# Patient Record
Sex: MALE | Race: WHITE | NOT HISPANIC OR LATINO
[De-identification: names, ages, dates, MRNs, and addresses within clinical notes are randomized per-mention and may not be internally consistent; named-entity substitution may affect disease eponyms.]

---

## 2024-07-02 PROBLEM — Z00.00 ENCOUNTER FOR PREVENTIVE HEALTH EXAMINATION: Status: ACTIVE | Noted: 2024-07-02

## 2024-07-03 DIAGNOSIS — Z87.898 PERSONAL HISTORY OF OTHER SPECIFIED CONDITIONS: ICD-10-CM

## 2024-07-03 DIAGNOSIS — Z87.891 PERSONAL HISTORY OF NICOTINE DEPENDENCE: ICD-10-CM

## 2024-07-03 DIAGNOSIS — I35.0 NONRHEUMATIC AORTIC (VALVE) STENOSIS: ICD-10-CM

## 2024-07-04 ENCOUNTER — NON-APPOINTMENT (OUTPATIENT)
Age: 79
End: 2024-07-04

## 2024-07-05 ENCOUNTER — APPOINTMENT (OUTPATIENT)
Dept: CARDIOTHORACIC SURGERY | Facility: CLINIC | Age: 79
End: 2024-07-05
Payer: COMMERCIAL

## 2024-07-05 ENCOUNTER — NON-APPOINTMENT (OUTPATIENT)
Age: 79
End: 2024-07-05

## 2024-07-05 VITALS
TEMPERATURE: 97.2 F | OXYGEN SATURATION: 97 % | DIASTOLIC BLOOD PRESSURE: 74 MMHG | SYSTOLIC BLOOD PRESSURE: 120 MMHG | WEIGHT: 190 LBS | BODY MASS INDEX: 25.73 KG/M2 | HEIGHT: 72 IN | HEART RATE: 72 BPM

## 2024-07-05 PROCEDURE — 99204 OFFICE O/P NEW MOD 45 MIN: CPT

## 2024-07-06 LAB — NT-PROBNP SERPL-MCNC: 781 PG/ML

## 2024-07-17 RX ORDER — PNV NO.95/FERROUS FUM/FOLIC AC 28MG-0.8MG
TABLET ORAL
Refills: 0 | Status: ACTIVE | COMMUNITY

## 2024-07-18 ENCOUNTER — APPOINTMENT (OUTPATIENT)
Dept: NEPHROLOGY | Facility: CLINIC | Age: 79
End: 2024-07-18
Payer: COMMERCIAL

## 2024-07-18 VITALS
OXYGEN SATURATION: 99 % | WEIGHT: 190 LBS | DIASTOLIC BLOOD PRESSURE: 78 MMHG | HEART RATE: 78 BPM | SYSTOLIC BLOOD PRESSURE: 124 MMHG | BODY MASS INDEX: 25.77 KG/M2

## 2024-07-18 DIAGNOSIS — Z80.8 FAMILY HISTORY OF MALIGNANT NEOPLASM OF OTHER ORGANS OR SYSTEMS: ICD-10-CM

## 2024-07-18 DIAGNOSIS — Z82.49 FAMILY HISTORY OF ISCHEMIC HEART DISEASE AND OTHER DISEASES OF THE CIRCULATORY SYSTEM: ICD-10-CM

## 2024-07-18 DIAGNOSIS — N18.32 CHRONIC KIDNEY DISEASE, STAGE 3B: ICD-10-CM

## 2024-07-18 DIAGNOSIS — Z78.9 OTHER SPECIFIED HEALTH STATUS: ICD-10-CM

## 2024-07-18 DIAGNOSIS — Z80.0 FAMILY HISTORY OF MALIGNANT NEOPLASM OF DIGESTIVE ORGANS: ICD-10-CM

## 2024-07-18 PROCEDURE — G2211 COMPLEX E/M VISIT ADD ON: CPT | Mod: NC

## 2024-07-18 PROCEDURE — 99204 OFFICE O/P NEW MOD 45 MIN: CPT

## 2024-07-22 ENCOUNTER — NON-APPOINTMENT (OUTPATIENT)
Age: 79
End: 2024-07-22

## 2024-07-22 DIAGNOSIS — R42 DIZZINESS AND GIDDINESS: ICD-10-CM

## 2024-07-22 LAB
ALBUMIN SERPL ELPH-MCNC: 4.3 G/DL
ANION GAP SERPL CALC-SCNC: 13 MMOL/L
BUN SERPL-MCNC: 25 MG/DL
CALCIUM SERPL-MCNC: 9.7 MG/DL
CALCIUM SERPL-MCNC: 9.7 MG/DL
CHLORIDE SERPL-SCNC: 103 MMOL/L
CO2 SERPL-SCNC: 24 MMOL/L
CREAT SERPL-MCNC: 1.47 MG/DL
CREAT SPEC-SCNC: 53 MG/DL
CREAT/PROT UR: 0.1 RATIO
EGFR: 48 ML/MIN/1.73M2
GLUCOSE SERPL-MCNC: 108 MG/DL
M PROTEIN SPEC IFE-MCNC: NORMAL
PARATHYROID HORMONE INTACT: 33 PG/ML
PHOSPHATE SERPL-MCNC: 3.3 MG/DL
POTASSIUM SERPL-SCNC: 4.7 MMOL/L
PROT UR-MCNC: 5 MG/DL
SODIUM SERPL-SCNC: 139 MMOL/L

## 2024-08-05 ENCOUNTER — RESULT REVIEW (OUTPATIENT)
Age: 79
End: 2024-08-05

## 2024-08-06 ENCOUNTER — RESULT REVIEW (OUTPATIENT)
Age: 79
End: 2024-08-06

## 2024-08-12 ENCOUNTER — APPOINTMENT (OUTPATIENT)
Dept: CARDIOTHORACIC SURGERY | Facility: CLINIC | Age: 79
End: 2024-08-12
Payer: COMMERCIAL

## 2024-08-12 DIAGNOSIS — I35.0 NONRHEUMATIC AORTIC (VALVE) STENOSIS: ICD-10-CM

## 2024-08-12 PROCEDURE — 99214 OFFICE O/P EST MOD 30 MIN: CPT

## 2024-08-13 NOTE — HISTORY OF PRESENT ILLNESS
[FreeTextEntry1] : Referred by Dr. Bautista Ni  79M, former smoker, with PMH of skin CA (basal cell of chest), vertigo, OA of b/l knees (L >R), stage 3 CKD (recent Cr 1.70), prior smoker (1PPD x30yrs, d/c'd 30yrs ago) who had a recent episode of dizziness went to a local urgent care with exam notable for a heart murmur and was found to have LFLG severe AS who presents for follow up after testing.    ECHO 6/4/24 at [facility] shows normal EF of > 55%, mild concentric hypertrophy, Paradoxical low flow low gradient severe aortic stenosis, moderate mitral annular calcification mild MR; mean gradient 39mmHg, LAURA 0.7cm2, SVi 32cc/m2, DVI 0.19.   7/18/24: BUN/Cr 25/1.45  EKG: nsr QRS 98mse, LAFB  TAVR CTs done on 8/6/2024 which showed: IMPRESSION: Cardiac: 1.  Please note, study is not optimized for coronary artery evaluation. 2.  Less than 50% stenosis of the Left Main. 3.  Calcific plaque in the proximal LAD where significant stenosis cannot be excluded. 4.  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 No obvious stenosis or plaque noted. 5.  Nonobstructive disease in portions of the remaining segments.  Non-cardiac: - No aortic dissection, aortic aneurysm or aorta stenosis. Diffuse atherosclerotic calcifications as above. Multiple ulcerated plaques in the aortic arch and proximal descending thoracic aorta. Aortic valve and coronary artery calcifications. - Mild subpleural reticulations in the lingula and both lower lobes suggesting mild chronic interstitial lung disease. - Colonic diverticulosis. - 5 mm nonspecific hypervascular liver lesion which may be benign or malignant etiologies. Recommend a follow-up MRI with hepatic mass protocol/with and without IV contrast. CT head: No acute intracranial hemorrhage, mass effect, or midline shift.  Since his last visit, the patient states he has been feeling well.  He has had no further dizziness (only lasted 2-3 days).  He denies chest pain, shortness of breath at rest, syncope, orthopnea, PND, or LE edema.  He states he has no shortness of breath with exertion, however, he is limited in his mobility with his arthritis in his knees.    shx: etoh use, walk dog daily, , lives with wife. Will be on vacation from Aug 12-25 fhx: noncontributory

## 2024-08-13 NOTE — ASSESSMENT
[FreeTextEntry1] : 79M, former smoker, with PMH of skin CA (basal cell of chest), vertigo, OA of b/l knees (L >R), stage 3 CKD (recent Cr 1.70), prior smoker (1PPD x30yrs, d/c'd 30yrs ago) who had a recent episode of dizziness went to a local urgent care with exam notable for a heart murmur and was found to have LFLG severe AS who presents for follow up after testing.  The patient is clinically stable; NYHA Class I.  The TAVR CTs were independently reviewed by Dr. Staley which shows a calcified aortic valve and the patient is a candidate for a transfemoral TAVR.  The TAVR procedure and its risks including stroke, bleeding, vascular complications, need for PPM, etc. were discussed with the patient.  The patient's BNP is elevated indicated stress on the heart from the valve disease and the results were discussed with the patient. Dr. Staley recommends the patient have a stress ECHO to further evaluate the patient's activity tolerance and symptoms to determine when intervention is appropriate.  The patient agrees and verbalized understanding of the plan.  All questions answered.

## 2024-08-13 NOTE — REASON FOR VISIT
[Structural Heart and Valve Disease] : structural heart and valve disease [Home] : at home, [unfilled] , at the time of the visit. [Medical Office: ([facility])___] : at the medical office located in  [Patient] : the patient [Self] : self

## 2024-08-13 NOTE — REASON FOR VISIT
[Structural Heart and Valve Disease] : structural heart and valve disease [Home] : at home, [unfilled] , at the time of the visit. [Medical Office: ([facility])___] : at the medical office located in  [Patient] : the patient [Self] : self

## 2024-08-13 NOTE — PLAN
[TextEntry] :  (1) Schedule Stress ECHO to evaluate symptoms and activity tolerance (2) Follow up with Dr. Staley and Dr. aWgner for surgical consult after testing (3) Continue cardiology care and medication management with Dr. Ni

## 2024-08-13 NOTE — PLAN
[TextEntry] :  (1) Schedule Stress ECHO to evaluate symptoms and activity tolerance (2) Follow up with Dr. Staley and Dr. Wagner for surgical consult after testing (3) Continue cardiology care and medication management with Dr. Ni

## 2024-08-13 NOTE — END OF VISIT
[FreeTextEntry3] : I, Chastity Jerry, am scribing for Dr. Benny Staley the following sections: HISTORY OF PRESENT ILLNESS, PAST MEDICAL/FAMILY/SOCIAL HISTORY, REVIEW OF SYSTEMS, VITAL SIGNS, PHYSICAL EXAM AND DISPOSITION.   I personally performed the services described in the documentation and reviewed the documented recorded by the scribe in my presence; it accurately and completely records my words and actions.

## 2024-09-17 ENCOUNTER — NON-APPOINTMENT (OUTPATIENT)
Age: 79
End: 2024-09-17

## 2024-09-23 ENCOUNTER — NON-APPOINTMENT (OUTPATIENT)
Age: 79
End: 2024-09-23

## 2024-09-23 ENCOUNTER — APPOINTMENT (OUTPATIENT)
Dept: CARDIOTHORACIC SURGERY | Facility: CLINIC | Age: 79
End: 2024-09-23
Payer: COMMERCIAL

## 2024-09-23 VITALS
BODY MASS INDEX: 25.73 KG/M2 | WEIGHT: 190 LBS | TEMPERATURE: 98.7 F | OXYGEN SATURATION: 98 % | SYSTOLIC BLOOD PRESSURE: 130 MMHG | HEIGHT: 72 IN | HEART RATE: 58 BPM | DIASTOLIC BLOOD PRESSURE: 70 MMHG

## 2024-09-23 PROCEDURE — 99203 OFFICE O/P NEW LOW 30 MIN: CPT

## 2024-09-24 ENCOUNTER — NON-APPOINTMENT (OUTPATIENT)
Age: 79
End: 2024-09-24

## 2024-09-24 LAB
ALBUMIN SERPL ELPH-MCNC: 4.4 G/DL
ALP BLD-CCNC: 51 U/L
ALT SERPL-CCNC: 14 U/L
ANION GAP SERPL CALC-SCNC: 14 MMOL/L
APTT BLD: 33.3 SEC
AST SERPL-CCNC: 21 U/L
BILIRUB SERPL-MCNC: 0.5 MG/DL
BUN SERPL-MCNC: 28 MG/DL
CALCIUM SERPL-MCNC: 9.8 MG/DL
CHLORIDE SERPL-SCNC: 103 MMOL/L
CO2 SERPL-SCNC: 23 MMOL/L
CREAT SERPL-MCNC: 1.45 MG/DL
EGFR: 49 ML/MIN/1.73M2
GLUCOSE SERPL-MCNC: 68 MG/DL
HCT VFR BLD CALC: 42.6 %
HGB BLD-MCNC: 14.3 G/DL
INR PPP: 0.95 RATIO
MCHC RBC-ENTMCNC: 33.6 GM/DL
MCHC RBC-ENTMCNC: 34.1 PG
MCV RBC AUTO: 101.7 FL
PLATELET # BLD AUTO: 144 K/UL
POTASSIUM SERPL-SCNC: 5.2 MMOL/L
PROT SERPL-MCNC: 7.7 G/DL
PT BLD: 10.8 SEC
RBC # BLD: 4.19 M/UL
RBC # FLD: 13.6 %
SODIUM SERPL-SCNC: 140 MMOL/L
WBC # FLD AUTO: 5.15 K/UL

## 2024-09-25 NOTE — HISTORY OF PRESENT ILLNESS
[FreeTextEntry1] : Referred by Dr. Bautista Ni  79M, former smoker, with PMH of skin CA (basal cell of chest), vertigo, OA of b/l knees (L >R), stage 3 CKD (recent Cr 1.70), prior smoker (1PPD x30yrs, d/c'd 30yrs ago) who had a recent episode of dizziness went to a local urgent care with exam notable for a heart murmur and was found to have LFLG severe AS for surgical consult.   ECHO 6/4/24 at [facility] shows normal EF of > 55%, mild concentric hypertrophy, Paradoxical low flow low gradient severe aortic stenosis, moderate mitral annular calcification mild MR; mean gradient 39mmHg, LAURA 0.7cm2, SVi 32cc/m2, DVI 0.19.   7/18/24: BUN/Cr 25/1.45  EKG: nsr QRS 98mse, LAFB  TAVR CTs done on 8/6/2024 which showed: IMPRESSION: Cardiac: 1.  Please note, study is not optimized for coronary artery evaluation. 2.  Less than 50% stenosis of the Left Main. 3.  Calcific plaque in the proximal LAD where significant stenosis cannot be excluded. 4.  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 No obvious stenosis or plaque noted. 5.  Nonobstructive disease in portions of the remaining segments.  Non-cardiac: - No aortic dissection, aortic aneurysm or aorta stenosis. Diffuse atherosclerotic calcifications as above. Multiple ulcerated plaques in the aortic arch and proximal descending thoracic aorta. Aortic valve and coronary artery calcifications. - Mild subpleural reticulations in the lingula and both lower lobes suggesting mild chronic interstitial lung disease. - Colonic diverticulosis. - 5 mm nonspecific hypervascular liver lesion which may be benign or malignant etiologies. Recommend a follow-up MRI with hepatic mass protocol/with and without IV contrast. CT head: No acute intracranial hemorrhage, mass effect, or midline shift.  The patient states he has been feeling well.  He has had no further dizziness (only lasted 2-3 days).  He denies chest pain, shortness of breath at rest, syncope, orthopnea, PND, or LE edema.  He states he has no shortness of breath with exertion, however, he is limited in his mobility with his arthritis in his knees.

## 2024-09-25 NOTE — ASSESSMENT
[FreeTextEntry1] : 79M, former smoker, with PMH of skin CA (basal cell of chest), vertigo, OA of b/l knees (L >R), stage 3 CKD (recent Cr 1.70), prior smoker (1PPD x30yrs, d/c'd 30yrs ago) who had a recent episode of dizziness went to a local urgent care with exam notable for a heart murmur and was found to have LFLG severe AS who presents for SAVR vs TAVR.  Discussed SAVR vs TAVR, based on discussions, pt may benefit from TAVR.  Pt is low risk for conventional SAVR, recovery may be limited by osteoarthritis of the knees.  DIscussed TAVR, procedural details, perioperative events, risks and benefits- risks include, but not limited to bleeding, infection, stroke, need for ppm, injury to kidneys, vascular or cardiac structures, need for emergency surgery.   Based on CT scan, will also need LHC at time of tavr.  Discussed the potential need for stenting and if so, would delay TAVR.  Pt asked questions that were answered to the best of my ability, pt agreed to proceed. labs today LHC/TAVR

## 2024-09-25 NOTE — PHYSICAL EXAM
[General Appearance - Alert] : alert [General Appearance - In No Acute Distress] : in no acute distress [General Appearance - Well Nourished] : well nourished [Sclera] : the sclera and conjunctiva were normal [Outer Ear] : the ears and nose were normal in appearance [Neck Appearance] : the appearance of the neck was normal [] : no respiratory distress [Respiration, Rhythm And Depth] : normal respiratory rhythm and effort [Exaggerated Use Of Accessory Muscles For Inspiration] : no accessory muscle use [Abnormal Walk] : normal gait [Oriented To Time, Place, And Person] : oriented to person, place, and time [Impaired Insight] : insight and judgment were intact [Affect] : the affect was normal

## 2024-10-08 ENCOUNTER — NON-APPOINTMENT (OUTPATIENT)
Age: 79
End: 2024-10-08

## 2024-10-08 VITALS
RESPIRATION RATE: 16 BRPM | DIASTOLIC BLOOD PRESSURE: 77 MMHG | TEMPERATURE: 97 F | HEIGHT: 72 IN | HEART RATE: 82 BPM | OXYGEN SATURATION: 98 % | SYSTOLIC BLOOD PRESSURE: 156 MMHG | WEIGHT: 190.04 LBS

## 2024-10-08 NOTE — PRE-OP CHECKLIST - LATEX ALLERGY
Repatha   Received: Today   Message Contents   Flor Delvalle, Med Ass't  Roxane Brown R.N.   Phone Number: [phone]             [place] pt wife Patti wants info/help on repatha, doesn't know how to go about getting it.     Please call her at [phone]        Previously, pt took repatha which was managed by the [facility]. [facility] clinic was able to get medication for zero co-pay for them. Pt stopped the medication because he thought it was giving him myalgias. They later found out this was not true. Pt needs new PA for new year.  Explained to wife that this can be discussed further and final decisions can be made at upcoming appt 2/19.     Per wife, pt is doing better. Sounds like he is only taking metoprolol 12.5mg BID as he was not tolerating full dose.   
no

## 2024-10-08 NOTE — PATIENT PROFILE ADULT - FUNCTIONAL ASSESSMENT - DAILY ACTIVITY SCORE.
Patient referred to skilled nursing for short term rehab. Await bed offer. Patient has Care Improvement Plus and she does not require a three night stay. Rita Ramos 24

## 2024-10-08 NOTE — PATIENT PROFILE ADULT - FALL HARM RISK - HARM RISK INTERVENTIONS
Assistance with ambulation/Assistance OOB with selected safe patient handling equipment/Communicate Risk of Fall with Harm to all staff/Discuss with provider need for PT consult/Monitor for mental status changes/Monitor gait and stability/Move patient closer to nurses' station/Orthostatic vital signs/Provide patient with walking aids - walker, cane, crutches/Reinforce activity limits and safety measures with patient and family/Reorient to person, place and time as needed/Review medications for side effects contributing to fall risk/Sit up slowly, dangle for a short time, stand at bedside before walking/Tailored Fall Risk Interventions/Toileting schedule using arm’s reach rule for commode and bathroom/Use of alarms - bed, chair and/or voice tab/Visual Cue: Yellow wristband and red socks/Bed in lowest position, wheels locked, appropriate side rails in place/Call bell, personal items and telephone in reach/Instruct patient to call for assistance before getting out of bed or chair/Non-slip footwear when patient is out of bed/[place] to call system/Physically safe environment - no spills, clutter or unnecessary equipment/Purposeful Proactive Rounding/Room/bathroom lighting operational, light cord in reach

## 2024-10-09 ENCOUNTER — TRANSCRIPTION ENCOUNTER (OUTPATIENT)
Age: 79
End: 2024-10-09

## 2024-10-09 ENCOUNTER — INPATIENT (INPATIENT)
Facility: HOSPITAL | Age: 79
LOS: 0 days | Discharge: HOME HEALTH | End: 2024-10-10
Attending: INTERNAL MEDICINE | Admitting: INTERNAL MEDICINE
Payer: COMMERCIAL

## 2024-10-09 ENCOUNTER — APPOINTMENT (OUTPATIENT)
Dept: CARDIOTHORACIC SURGERY | Facility: HOSPITAL | Age: 79
End: 2024-10-09

## 2024-10-09 ENCOUNTER — RESULT REVIEW (OUTPATIENT)
Age: 79
End: 2024-10-09

## 2024-10-09 LAB
ALBUMIN SERPL ELPH-MCNC: 3.4 G/DL — SIGNIFICANT CHANGE UP (ref 3.3–5)
ALBUMIN SERPL ELPH-MCNC: 4.4 G/DL — SIGNIFICANT CHANGE UP (ref 3.3–5)
ALP SERPL-CCNC: 42 U/L — SIGNIFICANT CHANGE UP (ref 40–120)
ALP SERPL-CCNC: 51 U/L — SIGNIFICANT CHANGE UP (ref 40–120)
ALT FLD-CCNC: 12 U/L — SIGNIFICANT CHANGE UP (ref 10–45)
ALT FLD-CCNC: 9 U/L — LOW (ref 10–45)
ANION GAP SERPL CALC-SCNC: 10 MMOL/L — SIGNIFICANT CHANGE UP (ref 5–17)
ANION GAP SERPL CALC-SCNC: 8 MMOL/L — SIGNIFICANT CHANGE UP (ref 5–17)
APTT BLD: 31.4 SEC — SIGNIFICANT CHANGE UP (ref 24.5–35.6)
APTT BLD: 34.5 SEC — SIGNIFICANT CHANGE UP (ref 24.5–35.6)
APTT BLD: 48.9 SEC — HIGH (ref 24.5–35.6)
AST SERPL-CCNC: 17 U/L — SIGNIFICANT CHANGE UP (ref 10–40)
AST SERPL-CCNC: 21 U/L — SIGNIFICANT CHANGE UP (ref 10–40)
BASE EXCESS BLDA CALC-SCNC: -2.3 MMOL/L — LOW (ref -2–3)
BASE EXCESS BLDA CALC-SCNC: -3.8 MMOL/L — LOW (ref -2–3)
BASE EXCESS BLDA CALC-SCNC: -4.7 MMOL/L — LOW (ref -2–3)
BASOPHILS # BLD AUTO: 0.02 K/UL — SIGNIFICANT CHANGE UP (ref 0–0.2)
BASOPHILS NFR BLD AUTO: 0.3 % — SIGNIFICANT CHANGE UP (ref 0–2)
BILIRUB SERPL-MCNC: 0.4 MG/DL — SIGNIFICANT CHANGE UP (ref 0.2–1.2)
BILIRUB SERPL-MCNC: 0.6 MG/DL — SIGNIFICANT CHANGE UP (ref 0.2–1.2)
BLD GP AB SCN SERPL QL: NEGATIVE — SIGNIFICANT CHANGE UP
BUN SERPL-MCNC: 21 MG/DL — SIGNIFICANT CHANGE UP (ref 7–23)
BUN SERPL-MCNC: 22 MG/DL — SIGNIFICANT CHANGE UP (ref 7–23)
CA-I BLDA-SCNC: 1.1 MMOL/L — LOW (ref 1.15–1.33)
CA-I BLDA-SCNC: 1.1 MMOL/L — LOW (ref 1.15–1.33)
CA-I BLDA-SCNC: 1.13 MMOL/L — LOW (ref 1.15–1.33)
CALCIUM SERPL-MCNC: 8.3 MG/DL — LOW (ref 8.4–10.5)
CALCIUM SERPL-MCNC: 9.3 MG/DL — SIGNIFICANT CHANGE UP (ref 8.4–10.5)
CHLORIDE SERPL-SCNC: 104 MMOL/L — SIGNIFICANT CHANGE UP (ref 96–108)
CHLORIDE SERPL-SCNC: 107 MMOL/L — SIGNIFICANT CHANGE UP (ref 96–108)
CO2 BLDA-SCNC: 22 MMOL/L — SIGNIFICANT CHANGE UP (ref 19–24)
CO2 SERPL-SCNC: 22 MMOL/L — SIGNIFICANT CHANGE UP (ref 22–31)
CO2 SERPL-SCNC: 26 MMOL/L — SIGNIFICANT CHANGE UP (ref 22–31)
COHGB MFR BLDA: 0.4 % — SIGNIFICANT CHANGE UP
COHGB MFR BLDA: 0.5 % — SIGNIFICANT CHANGE UP
COHGB MFR BLDA: 0.5 % — SIGNIFICANT CHANGE UP
CREAT SERPL-MCNC: 1.32 MG/DL — HIGH (ref 0.5–1.3)
CREAT SERPL-MCNC: 1.47 MG/DL — HIGH (ref 0.5–1.3)
EGFR: 48 ML/MIN/1.73M2 — LOW
EGFR: 55 ML/MIN/1.73M2 — LOW
EOSINOPHIL # BLD AUTO: 0.05 K/UL — SIGNIFICANT CHANGE UP (ref 0–0.5)
EOSINOPHIL NFR BLD AUTO: 0.8 % — SIGNIFICANT CHANGE UP (ref 0–6)
GAS PNL BLDA: SIGNIFICANT CHANGE UP
GLUCOSE BLDA-MCNC: 103 MG/DL — HIGH (ref 70–99)
GLUCOSE BLDA-MCNC: 105 MG/DL — HIGH (ref 70–99)
GLUCOSE BLDA-MCNC: 98 MG/DL — SIGNIFICANT CHANGE UP (ref 70–99)
GLUCOSE SERPL-MCNC: 110 MG/DL — HIGH (ref 70–99)
GLUCOSE SERPL-MCNC: 111 MG/DL — HIGH (ref 70–99)
HCO3 BLDA-SCNC: 21 MMOL/L — SIGNIFICANT CHANGE UP (ref 21–28)
HCT VFR BLD CALC: 33.5 % — LOW (ref 39–50)
HCT VFR BLD CALC: 41.1 % — SIGNIFICANT CHANGE UP (ref 39–50)
HGB BLD-MCNC: 11.6 G/DL — LOW (ref 13–17)
HGB BLD-MCNC: 14 G/DL — SIGNIFICANT CHANGE UP (ref 13–17)
HGB BLDA-MCNC: 11.6 G/DL — LOW (ref 12.6–17.4)
HGB BLDA-MCNC: 11.6 G/DL — LOW (ref 12.6–17.4)
HGB BLDA-MCNC: 12.6 G/DL — SIGNIFICANT CHANGE UP (ref 12.6–17.4)
IMM GRANULOCYTES NFR BLD AUTO: 1.1 % — HIGH (ref 0–0.9)
INR BLD: 1 — SIGNIFICANT CHANGE UP (ref 0.85–1.16)
INR BLD: 1.03 — SIGNIFICANT CHANGE UP (ref 0.85–1.16)
INR BLD: 1.12 — SIGNIFICANT CHANGE UP (ref 0.85–1.16)
LYMPHOCYTES # BLD AUTO: 1.06 K/UL — SIGNIFICANT CHANGE UP (ref 1–3.3)
LYMPHOCYTES # BLD AUTO: 16.3 % — SIGNIFICANT CHANGE UP (ref 13–44)
MAGNESIUM SERPL-MCNC: 1.9 MG/DL — SIGNIFICANT CHANGE UP (ref 1.6–2.6)
MAGNESIUM SERPL-MCNC: 2 MG/DL — SIGNIFICANT CHANGE UP (ref 1.6–2.6)
MCHC RBC-ENTMCNC: 34.1 GM/DL — SIGNIFICANT CHANGE UP (ref 32–36)
MCHC RBC-ENTMCNC: 34.4 PG — HIGH (ref 27–34)
MCHC RBC-ENTMCNC: 34.5 PG — HIGH (ref 27–34)
MCHC RBC-ENTMCNC: 34.6 GM/DL — SIGNIFICANT CHANGE UP (ref 32–36)
MCV RBC AUTO: 101 FL — HIGH (ref 80–100)
MCV RBC AUTO: 99.7 FL — SIGNIFICANT CHANGE UP (ref 80–100)
METHGB MFR BLDA: 0.5 % — SIGNIFICANT CHANGE UP
METHGB MFR BLDA: 0.6 % — SIGNIFICANT CHANGE UP
METHGB MFR BLDA: 0.8 % — SIGNIFICANT CHANGE UP
MONOCYTES # BLD AUTO: 0.45 K/UL — SIGNIFICANT CHANGE UP (ref 0–0.9)
MONOCYTES NFR BLD AUTO: 6.9 % — SIGNIFICANT CHANGE UP (ref 2–14)
NEUTROPHILS # BLD AUTO: 4.84 K/UL — SIGNIFICANT CHANGE UP (ref 1.8–7.4)
NEUTROPHILS NFR BLD AUTO: 74.6 % — SIGNIFICANT CHANGE UP (ref 43–77)
NRBC # BLD: 0 /100 WBCS — SIGNIFICANT CHANGE UP (ref 0–0)
NRBC # BLD: 0 /100 WBCS — SIGNIFICANT CHANGE UP (ref 0–0)
OXYHGB MFR BLDA: 97.2 % — HIGH (ref 90–95)
OXYHGB MFR BLDA: 97.4 % — HIGH (ref 90–95)
OXYHGB MFR BLDA: 97.6 % — HIGH (ref 90–95)
PCO2 BLDA: 32 MMHG — LOW (ref 35–48)
PCO2 BLDA: 35 MMHG — SIGNIFICANT CHANGE UP (ref 35–48)
PCO2 BLDA: 41 MMHG — SIGNIFICANT CHANGE UP (ref 35–48)
PH BLDA: 7.32 — LOW (ref 7.35–7.45)
PH BLDA: 7.38 — SIGNIFICANT CHANGE UP (ref 7.35–7.45)
PH BLDA: 7.43 — SIGNIFICANT CHANGE UP (ref 7.35–7.45)
PLATELET # BLD AUTO: 109 K/UL — LOW (ref 150–400)
PLATELET # BLD AUTO: 135 K/UL — LOW (ref 150–400)
PO2 BLDA: 155 MMHG — HIGH (ref 83–108)
PO2 BLDA: 181 MMHG — HIGH (ref 83–108)
PO2 BLDA: 206 MMHG — HIGH (ref 83–108)
POTASSIUM BLDA-SCNC: 3.6 MMOL/L — SIGNIFICANT CHANGE UP (ref 3.5–5.1)
POTASSIUM BLDA-SCNC: 3.6 MMOL/L — SIGNIFICANT CHANGE UP (ref 3.5–5.1)
POTASSIUM BLDA-SCNC: 3.7 MMOL/L — SIGNIFICANT CHANGE UP (ref 3.5–5.1)
POTASSIUM SERPL-MCNC: 4 MMOL/L — SIGNIFICANT CHANGE UP (ref 3.5–5.3)
POTASSIUM SERPL-MCNC: 4.1 MMOL/L — SIGNIFICANT CHANGE UP (ref 3.5–5.3)
POTASSIUM SERPL-SCNC: 4 MMOL/L — SIGNIFICANT CHANGE UP (ref 3.5–5.3)
POTASSIUM SERPL-SCNC: 4.1 MMOL/L — SIGNIFICANT CHANGE UP (ref 3.5–5.3)
PROT SERPL-MCNC: 6.1 G/DL — SIGNIFICANT CHANGE UP (ref 6–8.3)
PROT SERPL-MCNC: 8 G/DL — SIGNIFICANT CHANGE UP (ref 6–8.3)
PROTHROM AB SERPL-ACNC: 11.5 SEC — SIGNIFICANT CHANGE UP (ref 9.9–13.4)
PROTHROM AB SERPL-ACNC: 12.1 SEC — SIGNIFICANT CHANGE UP (ref 9.9–13.4)
PROTHROM AB SERPL-ACNC: 13.1 SEC — SIGNIFICANT CHANGE UP (ref 9.9–13.4)
RBC # BLD: 3.36 M/UL — LOW (ref 4.2–5.8)
RBC # BLD: 4.07 M/UL — LOW (ref 4.2–5.8)
RBC # FLD: 13.2 % — SIGNIFICANT CHANGE UP (ref 10.3–14.5)
RBC # FLD: 13.2 % — SIGNIFICANT CHANGE UP (ref 10.3–14.5)
RH IG SCN BLD-IMP: POSITIVE — SIGNIFICANT CHANGE UP
SAO2 % BLDA: 98.5 % — HIGH (ref 94–98)
SAO2 % BLDA: 98.5 % — HIGH (ref 94–98)
SAO2 % BLDA: 98.6 % — HIGH (ref 94–98)
SODIUM BLDA-SCNC: 136 MMOL/L — SIGNIFICANT CHANGE UP (ref 136–145)
SODIUM BLDA-SCNC: 137 MMOL/L — SIGNIFICANT CHANGE UP (ref 136–145)
SODIUM BLDA-SCNC: 137 MMOL/L — SIGNIFICANT CHANGE UP (ref 136–145)
SODIUM SERPL-SCNC: 137 MMOL/L — SIGNIFICANT CHANGE UP (ref 135–145)
SODIUM SERPL-SCNC: 140 MMOL/L — SIGNIFICANT CHANGE UP (ref 135–145)
WBC # BLD: 6.18 K/UL — SIGNIFICANT CHANGE UP (ref 3.8–10.5)
WBC # BLD: 6.49 K/UL — SIGNIFICANT CHANGE UP (ref 3.8–10.5)
WBC # FLD AUTO: 6.18 K/UL — SIGNIFICANT CHANGE UP (ref 3.8–10.5)
WBC # FLD AUTO: 6.49 K/UL — SIGNIFICANT CHANGE UP (ref 3.8–10.5)

## 2024-10-09 PROCEDURE — 33361 REPLACE AORTIC VALVE PERQ: CPT | Mod: 62,Q0

## 2024-10-09 PROCEDURE — 93452 LEFT HRT CATH W/VENTRCLGRPHY: CPT | Mod: 26,59

## 2024-10-09 PROCEDURE — 93452 LEFT HRT CATH W/VENTRCLGRPHY: CPT | Mod: 26,80,59

## 2024-10-09 PROCEDURE — 93010 ELECTROCARDIOGRAM REPORT: CPT | Mod: 1L,76

## 2024-10-09 PROCEDURE — 93308 TTE F-UP OR LMTD: CPT | Mod: 26,59

## 2024-10-09 PROCEDURE — 71045 X-RAY EXAM CHEST 1 VIEW: CPT | Mod: 26

## 2024-10-09 DEVICE — EMERALD GUIDEWIRE 0.35: Type: IMPLANTABLE DEVICE | Status: FUNCTIONAL

## 2024-10-09 DEVICE — LOADING SYSTEM EVOLUT FX 23-29MM: Type: IMPLANTABLE DEVICE | Status: FUNCTIONAL

## 2024-10-09 DEVICE — VLV AORTIC EVOLUT FX PLUS 29MM: Type: IMPLANTABLE DEVICE | Status: FUNCTIONAL

## 2024-10-09 DEVICE — GUIDEWIRE LUNDERQUIST EXTRA-STIFF DOUBLE CURVED .035" X 260CM: Type: IMPLANTABLE DEVICE | Status: FUNCTIONAL

## 2024-10-09 DEVICE — WIREGUIDE TOROFLEX .018X40CM: Type: IMPLANTABLE DEVICE | Status: FUNCTIONAL

## 2024-10-09 DEVICE — PACING CATH PACEL RIGHT HEART CURVE 5FR: Type: IMPLANTABLE DEVICE | Status: FUNCTIONAL

## 2024-10-09 DEVICE — GWIRE GUID  0.035INX150CM: Type: IMPLANTABLE DEVICE | Status: FUNCTIONAL

## 2024-10-09 DEVICE — GWIRE JTIP 1.5MM .035X180CM: Type: IMPLANTABLE DEVICE | Status: FUNCTIONAL

## 2024-10-09 DEVICE — SHEATH INTRODUCER TERUMO PINNACLE CORONARY 8FR X 10CM X 0.038" MINI WIRE: Type: IMPLANTABLE DEVICE | Status: FUNCTIONAL

## 2024-10-09 DEVICE — INTRO MICROPUNC 4FRX10CM SS: Type: IMPLANTABLE DEVICE | Status: FUNCTIONAL

## 2024-10-09 DEVICE — CATH BLLN Z-MED 18X4: Type: IMPLANTABLE DEVICE | Status: FUNCTIONAL

## 2024-10-09 DEVICE — IMPLANTABLE DEVICE: Type: IMPLANTABLE DEVICE | Status: FUNCTIONAL

## 2024-10-09 DEVICE — CATH DX PIG 145 INFIN 5FRX110CM: Type: IMPLANTABLE DEVICE | Status: FUNCTIONAL

## 2024-10-09 DEVICE — INTRODUCER SHEATH KIT GLIDESHEATH SLENDER FLEX STRAIGHT 21G 6F X 10CM: Type: IMPLANTABLE DEVICE | Status: FUNCTIONAL

## 2024-10-09 DEVICE — CATH DX AL1 INFIN 5FRX100CM: Type: IMPLANTABLE DEVICE | Status: FUNCTIONAL

## 2024-10-09 DEVICE — SUT PERCLOSE PROGLIDE 6FR: Type: IMPLANTABLE DEVICE | Status: FUNCTIONAL

## 2024-10-09 DEVICE — CATH  INFINITI 5FR MULTIPACK: Type: IMPLANTABLE DEVICE | Status: FUNCTIONAL

## 2024-10-09 DEVICE — SHEATH INTRO DRYSEAL FLEX 14FR 33CM: Type: IMPLANTABLE DEVICE | Status: FUNCTIONAL

## 2024-10-09 DEVICE — CATH DX JL5 INFIN 5FRX100CM: Type: IMPLANTABLE DEVICE | Status: FUNCTIONAL

## 2024-10-09 RX ORDER — HYDRALAZINE HYDROCHLORIDE 100 MG/1
2.5 TABLET ORAL ONCE
Refills: 0 | Status: COMPLETED | OUTPATIENT
Start: 2024-10-09 | End: 2024-10-09

## 2024-10-09 RX ORDER — CHLORHEXIDINE GLUCONATE ORAL RINSE 1.2 MG/ML
1 SOLUTION DENTAL DAILY
Refills: 0 | Status: DISCONTINUED | OUTPATIENT
Start: 2024-10-09 | End: 2024-10-10

## 2024-10-09 RX ORDER — PANTOPRAZOLE SODIUM 40 MG/1
40 TABLET, DELAYED RELEASE ORAL
Refills: 0 | Status: DISCONTINUED | OUTPATIENT
Start: 2024-10-09 | End: 2024-10-10

## 2024-10-09 RX ORDER — ASPIRIN 325 MG
162 TABLET ORAL ONCE
Refills: 0 | Status: COMPLETED | OUTPATIENT
Start: 2024-10-09 | End: 2024-10-09

## 2024-10-09 RX ORDER — ACETAMINOPHEN 325 MG
650 TABLET ORAL EVERY 6 HOURS
Refills: 0 | Status: DISCONTINUED | OUTPATIENT
Start: 2024-10-09 | End: 2024-10-10

## 2024-10-09 RX ORDER — CEFAZOLIN SODIUM 1 G
2000 VIAL (EA) INJECTION EVERY 8 HOURS
Refills: 0 | Status: COMPLETED | OUTPATIENT
Start: 2024-10-09 | End: 2024-10-10

## 2024-10-09 RX ORDER — NICARDIPINE HCL 25 MG/10ML
5 AMPUL (ML) INTRAVENOUS
Qty: 40 | Refills: 0 | Status: DISCONTINUED | OUTPATIENT
Start: 2024-10-09 | End: 2024-10-10

## 2024-10-09 RX ORDER — ASPIRIN 325 MG
81 TABLET ORAL DAILY
Refills: 0 | Status: DISCONTINUED | OUTPATIENT
Start: 2024-10-09 | End: 2024-10-10

## 2024-10-09 RX ADMIN — Medication 100 MILLIGRAM(S): at 21:38

## 2024-10-09 RX ADMIN — Medication 162 MILLIGRAM(S): at 13:07

## 2024-10-09 RX ADMIN — Medication 25 MG/HR: at 19:45

## 2024-10-09 NOTE — BRIEF OPERATIVE NOTE - NSICDXBRIEFPROCEDURE_GEN_ALL_CORE_FT
PROCEDURES:  TAVR, percutaneous 09-Oct-2024 14:56:29 LHC/TF TAVR by [facility] with 29mm Corevalve and 18mm Zmed pre-dilation Stephy Pina

## 2024-10-09 NOTE — BRIEF OPERATIVE NOTE - OPERATION/FINDINGS
LHC/TF TAVR by [facility] with 29mm Corevalve and 18mm Zmed pre-dilation  Access: RRA 6F, LCFV 6F TVP, RCFA 18F Main  Closure: RRA TR Band, LCFV, RCFA 2 Perclose, 1 angioseal  LHC/TF TAVR by [facility] with 29mm Corevalve and 18mm Zmed pre-dilation  Access: RRA 6F, LCFV 6F TVP, RCFA 18F Main  Closure: RRA TR Band, LCFV 1 mattress stitch, RCFA 2 Perclose, 1 angioseal  LHC/TF TAVR by [facility] with 29mm Corevalve and 18mm Zmed pre-dilation  Access: RRA 6F, LCFV 6F TVP, RCFA 18F Main  Closure: RRA TR Band, LCFV TVP in place, RCFA 2 Perclose, 1 angioseal   EKG: preop NSR QRSD 104   intra-op QRS widened

## 2024-10-09 NOTE — PRE-ANESTHESIA EVALUATION ADULT - NSANTHPMHFT_GEN_ALL_CORE
History of Present Illness:   79M, former smoker, with PMH of skin CA (basal cell of chest), vertigo, OA of b/l knees (L >R), stage 3 CKD (recent Cr 1.70), prior smoker (1PPD x30yrs, d/c'd 30yrs ago) who had a recent episode of dizziness went to a   local urgent care with exam notable for a heart murmur and was found to have LFLG severe AS for surgical consult.    The patient states he has been feeling well. He has had no further dizziness (only lasted 2-3 days). He denies chest pain, shortness of breath at rest, syncope, orthopnea, PND, or LE edema. He states he has no shortness of breath with exertion, however, he is limited in his mobility with his arthritis in his knees.

## 2024-10-09 NOTE — PRE-ANESTHESIA EVALUATION ADULT - NSRADCARDRESULTSFT_GEN_ALL_CORE
10/9/24 ECG:  NSR @ 75 LAD     6/6/24 TTE:   mild MR, severe AS 64 PG, 39MG LAURA 0.7 cm2 mild conc LVH

## 2024-10-09 NOTE — H&P ADULT - HISTORY OF PRESENT ILLNESS
79M, former smoker, with PMH of skin CA (basal cell of chest), vertigo, OA of b/l knees (L >R), stage 3 CKD   (recent Cr 1.70), prior smoker (1PPD x30yrs, d/c'd 30yrs ago) who had a recent episode of dizziness went to a   local urgent care with exam notable for a heart murmur and was found to have LFLG severe AS for surgical   consult.    The patient states he has been feeling well. He has had no further dizziness (only lasted 2-3 days). He denies   chest pain, shortness of breath at rest, syncope, orthopnea, PND, or LE edema. He states he has no shortness of   breath with exertion, however, he is limited in his mobility with his arthritis in his knees.    Patient seen in same day holding area; Reports no changes to PMHx or medications since last seen by our team. Denies acute or current SOB, chest pain, palpitation, N/V/D, fever/chills, recent illness, or any other concerning symptoms.   79M, former smoker, with PMH of skin CA (basal cell of chest), vertigo, OA of b/l knees (L >R), stage 3 CKD (recent Cr 1.70), prior smoker (1PPD x30yrs, d/c'd 30yrs ago) who had a recent episode of dizziness went to a   local urgent care with exam notable for a heart murmur and was found to have LFLG severe AS for surgical consult.    The patient states he has been feeling well. He has had no further dizziness (only lasted 2-3 days). He denies chest pain, shortness of breath at rest, syncope, orthopnea, PND, or LE edema. He states he has no shortness of breath with exertion, however, he is limited in his mobility with his arthritis in his knees.    Patient seen in same day holding area; Reports no changes to PMHx or medications since last seen by our team. Denies acute or current SOB, chest pain, palpitation, N/V/D, fever/chills, recent illness, or any other concerning symptoms.

## 2024-10-09 NOTE — PRE-ANESTHESIA EVALUATION ADULT - NSANTHPEFT_GEN_ALL_CORE
A&Ox3  neuro:  neck:  lung:  cor:  extr: A&Ox3  neuro: nonfocal  neck: no JVD  lung: CTA  cor: RRR S1 S2 5/6 EMETERIO RUSB, 4/6 holosystolic murmur LLSB  extr: no c,c,e. Radial/Robb OK

## 2024-10-09 NOTE — H&P ADULT - ASSESSMENT
79M, former smoker, with PMH of skin CA (basal cell of chest), vertigo, OA of b/l knees (L >R), stage 3 CKD   (recent Cr 1.70), prior smoker (1PPD x30yrs, d/c'd 30yrs ago) who had a recent episode of dizziness went to a   local urgent care with exam notable for a heart murmur and was found to have LFLG severe AS who presents for   SAVR vs TAVR. Discussed SAVR vs TAVR, based on discussions, pt may benefit from TAVR. Pt is low risk for   conventional SAVR, recovery may be limited by osteoarthritis of the knees. DIscussed TAVR, procedural details,   perioperative events, risks and benefits- risks include, but not limited to bleeding, infection, stroke, need for ppm,   injury to kidneys, vascular or cardiac structures, need for emergency surgery. Based on CT scan, will also need   LHC at time of tavr. Discussed the potential need for stenting and if so, would delay TAVR. Pt asked questions that  were answered to the best of my ability, pt agreed to proceed.  labs today LHC/TAVR.    -Plan for TAVR today   -Given 162mg ASA this AM  -Admit to [facility] post op     Admit under   via same day surgery. Consent signed, placed on chart.  Risks/benefits reviewed, patient understands and agrees. T&S ordered and blood products placed on hold for OR.  To 9  post-op.     79M, former smoker, with PMH of skin CA (basal cell of chest), vertigo, OA of b/l knees (L >R), stage 3 CKD (recent Cr 1.70), prior smoker (1PPD x30yrs, d/c'd 30yrs ago) who had a recent episode of dizziness went to a   local urgent care with exam notable for a heart murmur and was found to have LFLG severe AS who presents for SAVR vs TAVR. Discussed SAVR vs TAVR, based on discussions, pt may benefit from TAVR. Pt is low risk for conventional SAVR, recovery may be limited by osteoarthritis of the knees. DIscussed TAVR, procedural details, perioperative events, risks and benefits- risks include, but not limited to bleeding, infection, stroke, need for ppm, injury to kidneys, vascular or cardiac structures, need for emergency surgery. Based on CT scan, will also need LHC at time of tavr. Discussed the potential need for stenting and if so, would delay TAVR. Pt asked questions thatwere answered to the best of my ability, pt agreed to proceed.    -Plan for TAVR today   -Given 162mg ASA this AM  -Admit to [facility] post op     Admit under   via same day surgery. Consent signed, placed on chart.  Risks/benefits reviewed, patient understands and agrees. T&S ordered and blood products placed on hold for OR.  To 9  post-op.

## 2024-10-09 NOTE — H&P ADULT - NSICDXPASTMEDICALHX_GEN_ALL_CORE_FT
PAST MEDICAL HISTORY:  CA skin, basal cell     Osteoarthritis sylvia knees    Osteoporosis     Stage 3 chronic kidney disease     Vertigo

## 2024-10-09 NOTE — H&P ADULT - NSHPLABSRESULTS_GEN_ALL_CORE
ECHO 6/4/24 at [facility] shows normal EF of > 55%, mild concentric hypertrophy, Paradoxical low flow low   gradient severe aortic stenosis, moderate mitral annular calcification mild MR; mean gradient 39mmHg, LAURA   0.7cm2, SVi 32cc/m2, DVI 0.19.     7/18/24: BUN/Cr 25/1.45    EKG: nsr QRS 98mse, LAFB    TAVR CTs done on 8/6/2024 which showed:  IMPRESSION:  Cardiac:  1. Please note, study is not optimized for coronary artery evaluation.  2. Less than 50% stenosis of the Left Main.  3. Calcific plaque in the proximal LAD where significant stenosis cannot be excluded.  4. 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 No obvious stenosis or plaque noted.  5. Nonobstructive disease in portions of the remaining segments.  Non-cardiac:  - No aortic dissection, aortic aneurysm or aorta stenosis. Diffuse atherosclerotic calcifications as above. Multiple   ulcerated plaques in the aortic arch and proximal descending thoracic aorta. Aortic valve and coronary artery   calcifications.  - Mild subpleural reticulations in the lingula and both lower lobes suggesting mild chronic interstitial lung disease.  - Colonic diverticulosis.  - 5 mm nonspecific hypervascular liver lesion which may be benign or malignant etiologies. Recommend a follow-  up MRI with hepatic mass protocol/with and without IV contrast.  CT head: No acute intracranial hemorrhage, mass effect

## 2024-10-09 NOTE — H&P ADULT - NSHPREVIEWOFSYSTEMS_GEN_ALL_CORE
Review of Systems  CONSTITUTIONAL:  Denies Fevers / chills, sweats, fatigue, weight loss, weight gain                                      NEURO:  Denies parathesias, seizures, syncope, confusion                                                                                EYES:  Denies Blurry vision, discharge, pain, loss of vision                                                                                    ENMT:  Denies Difficulty hearing, vertigo, dysphagia, epistaxis, recent dental work                                       CV:  Denies Chest pain, palpitations, BURCH, orthopnea                                                                                          RESPIRATORY:  Denies Wheezing, SOB, cough / sputum, hemoptysis                                                                GI:  Denies Nausea, vommiting, diarrhea, constipation, melena, difficulty swallowing                                               : Denies Hematuria, dysuria, urgency, incontinence                                                                                         MUSKULOSKELETAL:  Denies arthritis, joint swelling, muscle weakness                                                             SKIN/BREAST:  Denies rash, itching, henrry loss, masses                                                                                            PSYCH:  Denies depresion, anxiety, suicidal ideation                                                                                               HEME/LYMPH:  Denies bruises easily, enlarged lymph nodes, tender lymph nodes                                        ENDOCRINE:  Denies cold intolerance, heat intolerance, polydipsia

## 2024-10-09 NOTE — CHART NOTE - NSCHARTNOTEFT_GEN_A_CORE
Procedure: LHC/TF TAVR by [facility] with 29mm Corevalve and 18mm Zmed pre-dilation    Access: RRA 6F, LCFV 6F TVP, RCFA 18F Main  Closure: RRA TR Band, LCFV TVP in place, RCFA 2 Perclose, 1 angioseal       TVP: Yes: left groin   Pre-existing rhythm issues:  No QRS: 104  Intra-op rhythm issues: Yes - widening QRS  Post-op rhythm issues:  No QRS: 114  TR Band: Yes right radial - plan for off at 1800    VS:  Vital Signs Last 24 Hrs  T(C): 36.1 (09 Oct 2024 17:18), Max: 36.3 (09 Oct 2024 12:06)  T(F): 96.9 (09 Oct 2024 17:18), Max: 97.3 (09 Oct 2024 12:06)  HR: 58 (09 Oct 2024 17:30) (58 - 82)  BP: 156/77 (09 Oct 2024 12:11) (156/77 - 156/77)  RR: 8 (09 Oct 2024 17:30) (8 - 21)  SpO2: 94% (09 Oct 2024 17:30) (94% - 98%)    Parameters below as of 09 Oct 2024 17:30  Patient On (Oxygen Delivery Method): room air        Physical Exam  Appearance: No acute distress.  Neurologic: AAOx3, no AMS or focal deficits.  Responds appropriately to verbal and physical stimuli; exhibits purposeful movement in all extremities.  HEENT:   MMM, PERRLA, EOMI	b/l  Neck: Supple  Cardiovascular: RRR, S1 S2. No m/r/g.  Respiratory: No acute respiratory distress. CTA b/l, no w/r/r.   Gastrointestinal:  Soft, non-tender, non-distended, + BS.	  Skin: No rashes. No ecchymoses. No cyanosis.  Extremities: Exhibits normal range of motion, no clubbing, cyanosis or edema.  Vascular: Peripheral pulses palpable 2+ bilaterally.  Groin sites: bilateral groin sites and right radial site c/d/i.    Bed rest: x 4 hrs  Anti-platelet: aspirin  Dispo: possible home tomorrow  TTE/EKG ordered: yes - repeat ecg at 2000

## 2024-10-09 NOTE — H&P ADULT - CLICK TO LAUNCH ORM
.
Normal rate, regular rhythm.  Heart sounds S1, S2.  No rubs or gallops. 2+ pulses in bilateral dp and radial arteries. Cap refill less than 2 seconds.

## 2024-10-09 NOTE — BRIEF OPERATIVE NOTE - PRIMARY SURGEON
Medicare Wellness Visit  Plan for Preventive Care    A good way for you to stay healthy is to use preventive care.  Medicare covers many services that can help you stay healthy.* The goal of these services is to find any health problems as quickly as possible. Finding problems early can help make them easier to treat.  Your personal plan below lists the services you may need and when they are due.      Health Maintenance Summary     Hepatitis B Vaccine (For Physician/APC Discussion) (3 of 3 - 19+ 3-dose series)  Overdue since 6/7/2002    Shingles Vaccine (2 of 3)  Overdue since 4/14/2012    Breast Cancer Screening (Every 2 Years)  Overdue since 6/1/2017    Pneumococcal Vaccine 65+ (3 - PPSV23 if available, else PCV20)  Overdue since 10/9/2017    DTaP/Tdap/Td Vaccine (2 - Td or Tdap)  Overdue since 1/10/2022    Depression Screening (Yearly)  Overdue since 6/4/2022    COVID-19 Vaccine (4 - Booster for Moderna series)  Overdue since 9/28/2022    Medicare Advantage- Medicare Wellness Visit (Yearly - January to December)  Due since 1/1/2023    Colorectal Cancer Screen- (Cologuard - Every 3 Years)  Next due on 9/20/2024    Osteoporosis Screening   Ordered on 5/27/2022    Hepatitis C Screening   Completed    Influenza Vaccine   Completed    Meningococcal Vaccine   Aged Out    HPV Vaccine   Aged Out           Preventive Care for Women and Men    Heart Screenings (Cardiovascular):  · Blood tests are used to check your cholesterol, lipid and triglyceride levels. High levels can increase your risk for heart disease and stroke. High levels can be treated with medications, diet and exercise. Lowering your levels can help keep your heart and blood vessels healthy.  Your provider will order these tests if they are needed.    · An ultrasound is done to see if you have an abdominal aortic aneurysm (AAA).  This is an enlargement of one of the main blood vessels that delivers blood to the body.   In the [place], 9,000 deaths  are caused by AAA.  You may not even know you have this problem and as many as 1 in 3 people will have a serious problem if it is not treated.  Early diagnosis allows for more effective treatment and cure.  If you have a family history of AAA or are a male age 65-75 who has smoked, you are at higher risk of an AAA.  Your provider can order this test, if needed.    Colorectal Screening:  · There are many tests that are used to check for cancer of your colon and rectum. You and your provider should discuss what test is best for you and when to have it done.  Options include:  · Screening Colonoscopy: exam of the entire colon, seen through a flexible lighted tube.  · Flexible Sigmoidoscopy: exam of the last third (sigmoid portion) of the colon and rectum, seen through a flexible lighted tube.  · Cologuard DNA stool test: a sample of your stool is used to screen for cancer and unseen blood in your stool.  · Fecal Occult Blood Test: a sample of your stool is studied to find any unseen blood    Flu Shot:  · An immunization that helps to prevent influenza (the flu). You should get this every year. The best time to get the shot is in the fall.    Pneumococcal Shot:  • Vaccines help prevent pneumococcal disease, which is any type of illness caused by Streptococcus pneumoniae bacteria. There are two kinds of pneumococcal vaccines available in the [place]:   o Pneumococcal conjugate vaccines (PCV20 or Asmqsqg81®)  o Pneumococcal polysaccharide vaccine (PPSV23 or Uygysiamm79®)  · For those who have never received any pneumococcal conjugate vaccine, CDC recommends PVC20 for adults 65 years or older and adults 19 through 64 years old with certain medical conditions or risk factors.   · For those who have previously received PCV13, this should be followed by a dose of PPSV23.     Hepatitis B Shot:  · An immunization that helps to protect people from getting Hepatitis B. Hepatitis B is a virus that spreads through contact  with infected blood or body fluids. Many people with the virus do not have symptoms.  The virus can lead to serious problems, such as liver disease. Some people are at higher risk than others. Your doctor will tell you if you need this shot.     Diabetes Screening:  · A test to measure sugar (glucose) in your blood is called a fasting blood sugar. Fasting means you cannot have food or drink for at least 8 hours before the test. This test can detect diabetes long before you may notice symptoms.    Glaucoma Screening:  · Glaucoma screening is performed by your eye doctor. The test measures the fluid pressure inside your eyes to determine if you have glaucoma.     Hepatitis C Screening:  · A blood test to see if you have the hepatitis C virus.  Hepatitis C attacks the liver and is a major cause of chronic liver disease.  Medicare will cover a single screening for all adults born between 1945 & 1965, or high risk patients (people who have injected illegal drugs or people who have had blood transfusions).  High risk patients who continue to inject illegal drugs can be screened for Hepatitis C every year.    Smoking and Tobacco-Use Cessation Counseling:  · Tobacco is the single greatest cause of disease and early death in our country today. Medication and counseling together can increase a person’s chance of quitting for good.   · Medicare covers two quitting attempts per year, with four counseling sessions per attempt (eight sessions in a 12 month period)    Preventive Screening tests for Women    Screening Mammograms and Breast Exams:  · An x-ray of your breasts to check for breast cancer before you or your doctor may be able to feel it.  If breast cancer is found early it can usually be treated with success.    Pelvic Exams and Pap Tests:  · An exam to check for cervical and vaginal cancer. A Pap test is a lab test in which cells are taken from your cervix and sent to the lab to look for signs of cervical cancer. If  cancer of the cervix is found early, chances for a cure are good. Testing can generally end at age 65, or if a woman has a hysterectomy for a benign condition. Your provider may recommend more frequent testing if certain abnormal results are found.    Bone Mass Measurements:  · A painless x-ray of your bone density to see if you are at risk for a broken bone. Bone density refers to the thickness of bones or how tightly the bone tissue is packed.    Preventive Screening tests for Men    Prostate Screening:  · Should you have a prostate cancer test (PSA)?  It is up to you to decide if you want a prostate cancer test. Talk to your clinician to find out if the test is right for you.  Things for you to consider and talk about should include:  · Benefits and harms of the test  · Your family history  · How your race/ethnicity may influence the test  · If the test may impact other medical conditions you have  · Your values on screenings and treatments    *Medicare pays for many preventive services to keep you healthy. For some of these services, you might have to pay a deductible, coinsurance, and / or copayment.  The amounts vary depending on the type of services you need and the kind of Medicare health plan you have.    For further details on screenings offered by Medicare please visit: https://www.medicare.gov/coverage/preventive-screening-services    Benny Staley (Attending) <<----- Click to Select Surgeon

## 2024-10-09 NOTE — H&P ADULT - NSHPPHYSICALEXAM_GEN_ALL_CORE
Appearance: No acute distress.  Neurologic: AAOx3, no AMS or focal deficits.  Responds appropriately to verbal and physical stimuli; exhibits purposeful movement in all extremities.  Cardiovascular: +murmur, RRR, S1 S2. No r/g.  Respiratory: No acute respiratory distress. CTA b/l, no w/r/r.   Gastrointestinal:  Soft, non-tender, non-distended, + BS.	  Skin: No rashes. No ecchymoses. No cyanosis.  Extremities: Exhibits normal range of motion, no clubbing, cyanosis or edema.  Vascular: Peripheral pulses palpable 2+ bilaterally.

## 2024-10-10 ENCOUNTER — INPATIENT (INPATIENT)
Facility: HOSPITAL | Age: 79
LOS: 0 days | Discharge: HOME | DRG: 312 | End: 2024-10-11
Attending: INTERNAL MEDICINE | Admitting: INTERNAL MEDICINE
Payer: COMMERCIAL

## 2024-10-10 ENCOUNTER — RESULT REVIEW (OUTPATIENT)
Age: 79
End: 2024-10-10

## 2024-10-10 ENCOUNTER — TRANSCRIPTION ENCOUNTER (OUTPATIENT)
Age: 79
End: 2024-10-10

## 2024-10-10 ENCOUNTER — NON-APPOINTMENT (OUTPATIENT)
Age: 79
End: 2024-10-10

## 2024-10-10 VITALS
WEIGHT: 190.04 LBS | TEMPERATURE: 98 F | OXYGEN SATURATION: 98 % | HEART RATE: 67 BPM | DIASTOLIC BLOOD PRESSURE: 60 MMHG | RESPIRATION RATE: 16 BRPM | SYSTOLIC BLOOD PRESSURE: 96 MMHG | HEIGHT: 72 IN

## 2024-10-10 VITALS
HEART RATE: 69 BPM | DIASTOLIC BLOOD PRESSURE: 72 MMHG | SYSTOLIC BLOOD PRESSURE: 170 MMHG | OXYGEN SATURATION: 94 % | RESPIRATION RATE: 18 BRPM

## 2024-10-10 PROBLEM — Z98.49 CATARACT EXTRACTION STATUS, UNSPECIFIED EYE: Chronic | Status: ACTIVE | Noted: 2024-10-09

## 2024-10-10 PROBLEM — M81.0 AGE-RELATED OSTEOPOROSIS WITHOUT CURRENT PATHOLOGICAL FRACTURE: Chronic | Status: ACTIVE | Noted: 2024-10-08

## 2024-10-10 PROBLEM — M19.90 UNSPECIFIED OSTEOARTHRITIS, UNSPECIFIED SITE: Chronic | Status: ACTIVE | Noted: 2024-10-09

## 2024-10-10 PROBLEM — C44.91 BASAL CELL CARCINOMA OF SKIN, UNSPECIFIED: Chronic | Status: ACTIVE | Noted: 2024-10-08

## 2024-10-10 PROBLEM — N18.30 CHRONIC KIDNEY DISEASE, STAGE 3 UNSPECIFIED: Chronic | Status: ACTIVE | Noted: 2024-10-09

## 2024-10-10 PROBLEM — R42 DIZZINESS AND GIDDINESS: Chronic | Status: ACTIVE | Noted: 2024-10-08

## 2024-10-10 LAB
ALBUMIN SERPL ELPH-MCNC: 3.7 G/DL — SIGNIFICANT CHANGE UP (ref 3.3–5)
ALP SERPL-CCNC: 47 U/L — SIGNIFICANT CHANGE UP (ref 40–120)
ALT FLD-CCNC: 10 U/L — SIGNIFICANT CHANGE UP (ref 10–45)
ANION GAP SERPL CALC-SCNC: 13 MMOL/L — SIGNIFICANT CHANGE UP (ref 5–17)
ANION GAP SERPL CALC-SCNC: 8 MMOL/L — SIGNIFICANT CHANGE UP (ref 5–17)
APTT BLD: 31.8 SEC — SIGNIFICANT CHANGE UP (ref 24.5–35.6)
AST SERPL-CCNC: 25 U/L — SIGNIFICANT CHANGE UP (ref 10–40)
BASOPHILS # BLD AUTO: 0.01 K/UL — SIGNIFICANT CHANGE UP (ref 0–0.2)
BASOPHILS # BLD AUTO: 0.02 K/UL — SIGNIFICANT CHANGE UP (ref 0–0.2)
BASOPHILS NFR BLD AUTO: 0.1 % — SIGNIFICANT CHANGE UP (ref 0–2)
BASOPHILS NFR BLD AUTO: 0.3 % — SIGNIFICANT CHANGE UP (ref 0–2)
BILIRUB SERPL-MCNC: 0.6 MG/DL — SIGNIFICANT CHANGE UP (ref 0.2–1.2)
BUN SERPL-MCNC: 18 MG/DL — SIGNIFICANT CHANGE UP (ref 7–23)
BUN SERPL-MCNC: 19 MG/DL — SIGNIFICANT CHANGE UP (ref 7–23)
CALCIUM SERPL-MCNC: 8.7 MG/DL — SIGNIFICANT CHANGE UP (ref 8.4–10.5)
CALCIUM SERPL-MCNC: 9.2 MG/DL — SIGNIFICANT CHANGE UP (ref 8.4–10.5)
CHLORIDE SERPL-SCNC: 102 MMOL/L — SIGNIFICANT CHANGE UP (ref 96–108)
CHLORIDE SERPL-SCNC: 106 MMOL/L — SIGNIFICANT CHANGE UP (ref 96–108)
CO2 SERPL-SCNC: 21 MMOL/L — LOW (ref 22–31)
CO2 SERPL-SCNC: 23 MMOL/L — SIGNIFICANT CHANGE UP (ref 22–31)
CREAT SERPL-MCNC: 1.25 MG/DL — SIGNIFICANT CHANGE UP (ref 0.5–1.3)
CREAT SERPL-MCNC: 1.45 MG/DL — HIGH (ref 0.5–1.3)
EGFR: 49 ML/MIN/1.73M2 — LOW
EGFR: 59 ML/MIN/1.73M2 — LOW
EOSINOPHIL # BLD AUTO: 0.03 K/UL — SIGNIFICANT CHANGE UP (ref 0–0.5)
EOSINOPHIL # BLD AUTO: 0.04 K/UL — SIGNIFICANT CHANGE UP (ref 0–0.5)
EOSINOPHIL NFR BLD AUTO: 0.3 % — SIGNIFICANT CHANGE UP (ref 0–6)
EOSINOPHIL NFR BLD AUTO: 0.6 % — SIGNIFICANT CHANGE UP (ref 0–6)
GAS PNL BLDA: SIGNIFICANT CHANGE UP
GLUCOSE SERPL-MCNC: 109 MG/DL — HIGH (ref 70–99)
GLUCOSE SERPL-MCNC: 145 MG/DL — HIGH (ref 70–99)
HCT VFR BLD CALC: 37.2 % — LOW (ref 39–50)
HCT VFR BLD CALC: 41.6 % — SIGNIFICANT CHANGE UP (ref 39–50)
HGB BLD-MCNC: 13 G/DL — SIGNIFICANT CHANGE UP (ref 13–17)
HGB BLD-MCNC: 13.8 G/DL — SIGNIFICANT CHANGE UP (ref 13–17)
IMM GRANULOCYTES NFR BLD AUTO: 0.3 % — SIGNIFICANT CHANGE UP (ref 0–0.9)
IMM GRANULOCYTES NFR BLD AUTO: 0.3 % — SIGNIFICANT CHANGE UP (ref 0–0.9)
INR BLD: 1.01 — SIGNIFICANT CHANGE UP (ref 0.85–1.16)
LYMPHOCYTES # BLD AUTO: 0.77 K/UL — LOW (ref 1–3.3)
LYMPHOCYTES # BLD AUTO: 0.93 K/UL — LOW (ref 1–3.3)
LYMPHOCYTES # BLD AUTO: 10.1 % — LOW (ref 13–44)
LYMPHOCYTES # BLD AUTO: 11.3 % — LOW (ref 13–44)
MAGNESIUM SERPL-MCNC: 1.9 MG/DL — SIGNIFICANT CHANGE UP (ref 1.6–2.6)
MCHC RBC-ENTMCNC: 32.9 PG — SIGNIFICANT CHANGE UP (ref 27–34)
MCHC RBC-ENTMCNC: 33.2 GM/DL — SIGNIFICANT CHANGE UP (ref 32–36)
MCHC RBC-ENTMCNC: 34.4 PG — HIGH (ref 27–34)
MCHC RBC-ENTMCNC: 34.9 GM/DL — SIGNIFICANT CHANGE UP (ref 32–36)
MCV RBC AUTO: 98.4 FL — SIGNIFICANT CHANGE UP (ref 80–100)
MCV RBC AUTO: 99.3 FL — SIGNIFICANT CHANGE UP (ref 80–100)
MONOCYTES # BLD AUTO: 0.57 K/UL — SIGNIFICANT CHANGE UP (ref 0–0.9)
MONOCYTES # BLD AUTO: 0.78 K/UL — SIGNIFICANT CHANGE UP (ref 0–0.9)
MONOCYTES NFR BLD AUTO: 8.4 % — SIGNIFICANT CHANGE UP (ref 2–14)
MONOCYTES NFR BLD AUTO: 8.5 % — SIGNIFICANT CHANGE UP (ref 2–14)
NEUTROPHILS # BLD AUTO: 5.38 K/UL — SIGNIFICANT CHANGE UP (ref 1.8–7.4)
NEUTROPHILS # BLD AUTO: 7.41 K/UL — HIGH (ref 1.8–7.4)
NEUTROPHILS NFR BLD AUTO: 79.1 % — HIGH (ref 43–77)
NEUTROPHILS NFR BLD AUTO: 80.7 % — HIGH (ref 43–77)
NRBC # BLD: 0 /100 WBCS — SIGNIFICANT CHANGE UP (ref 0–0)
NRBC # BLD: 0 /100 WBCS — SIGNIFICANT CHANGE UP (ref 0–0)
PHOSPHATE SERPL-MCNC: 3.3 MG/DL — SIGNIFICANT CHANGE UP (ref 2.5–4.5)
PLATELET # BLD AUTO: 110 K/UL — LOW (ref 150–400)
PLATELET # BLD AUTO: 127 K/UL — LOW (ref 150–400)
POTASSIUM SERPL-MCNC: 4.1 MMOL/L — SIGNIFICANT CHANGE UP (ref 3.5–5.3)
POTASSIUM SERPL-MCNC: 4.3 MMOL/L — SIGNIFICANT CHANGE UP (ref 3.5–5.3)
POTASSIUM SERPL-SCNC: 4.1 MMOL/L — SIGNIFICANT CHANGE UP (ref 3.5–5.3)
POTASSIUM SERPL-SCNC: 4.3 MMOL/L — SIGNIFICANT CHANGE UP (ref 3.5–5.3)
PROT SERPL-MCNC: 6.8 G/DL — SIGNIFICANT CHANGE UP (ref 6–8.3)
PROTHROM AB SERPL-ACNC: 11.8 SEC — SIGNIFICANT CHANGE UP (ref 9.9–13.4)
RBC # BLD: 3.78 M/UL — LOW (ref 4.2–5.8)
RBC # BLD: 4.19 M/UL — LOW (ref 4.2–5.8)
RBC # FLD: 13.1 % — SIGNIFICANT CHANGE UP (ref 10.3–14.5)
RBC # FLD: 13.1 % — SIGNIFICANT CHANGE UP (ref 10.3–14.5)
SODIUM SERPL-SCNC: 136 MMOL/L — SIGNIFICANT CHANGE UP (ref 135–145)
SODIUM SERPL-SCNC: 137 MMOL/L — SIGNIFICANT CHANGE UP (ref 135–145)
WBC # BLD: 6.8 K/UL — SIGNIFICANT CHANGE UP (ref 3.8–10.5)
WBC # BLD: 9.19 K/UL — SIGNIFICANT CHANGE UP (ref 3.8–10.5)
WBC # FLD AUTO: 6.8 K/UL — SIGNIFICANT CHANGE UP (ref 3.8–10.5)
WBC # FLD AUTO: 9.19 K/UL — SIGNIFICANT CHANGE UP (ref 3.8–10.5)

## 2024-10-10 PROCEDURE — 82803 BLOOD GASES ANY COMBINATION: CPT

## 2024-10-10 PROCEDURE — 86900 BLOOD TYPING SEROLOGIC ABO: CPT

## 2024-10-10 PROCEDURE — 93306 TTE W/DOPPLER COMPLETE: CPT | Mod: 26

## 2024-10-10 PROCEDURE — 99285 EMERGENCY DEPT VISIT HI MDM: CPT

## 2024-10-10 PROCEDURE — 99239 HOSP IP/OBS DSCHRG MGMT >30: CPT

## 2024-10-10 PROCEDURE — 93010 ELECTROCARDIOGRAM REPORT: CPT | Mod: 76

## 2024-10-10 PROCEDURE — 83735 ASSAY OF MAGNESIUM: CPT

## 2024-10-10 PROCEDURE — 36415 COLL VENOUS BLD VENIPUNCTURE: CPT

## 2024-10-10 PROCEDURE — 71045 X-RAY EXAM CHEST 1 VIEW: CPT | Mod: 26

## 2024-10-10 PROCEDURE — 93010 ELECTROCARDIOGRAM REPORT: CPT

## 2024-10-10 PROCEDURE — 84100 ASSAY OF PHOSPHORUS: CPT

## 2024-10-10 PROCEDURE — 82330 ASSAY OF CALCIUM: CPT

## 2024-10-10 PROCEDURE — 71045 X-RAY EXAM CHEST 1 VIEW: CPT

## 2024-10-10 PROCEDURE — C1769: CPT

## 2024-10-10 PROCEDURE — 93306 TTE W/DOPPLER COMPLETE: CPT

## 2024-10-10 PROCEDURE — 85027 COMPLETE CBC AUTOMATED: CPT

## 2024-10-10 PROCEDURE — 93005 ELECTROCARDIOGRAM TRACING: CPT

## 2024-10-10 PROCEDURE — C1894: CPT

## 2024-10-10 PROCEDURE — 84132 ASSAY OF SERUM POTASSIUM: CPT

## 2024-10-10 PROCEDURE — C1725: CPT

## 2024-10-10 PROCEDURE — 86901 BLOOD TYPING SEROLOGIC RH(D): CPT

## 2024-10-10 PROCEDURE — 84295 ASSAY OF SERUM SODIUM: CPT

## 2024-10-10 PROCEDURE — 86923 COMPATIBILITY TEST ELECTRIC: CPT

## 2024-10-10 PROCEDURE — 86850 RBC ANTIBODY SCREEN: CPT

## 2024-10-10 PROCEDURE — C1887: CPT

## 2024-10-10 PROCEDURE — 85730 THROMBOPLASTIN TIME PARTIAL: CPT

## 2024-10-10 PROCEDURE — 80053 COMPREHEN METABOLIC PANEL: CPT

## 2024-10-10 PROCEDURE — C1889: CPT

## 2024-10-10 PROCEDURE — C8929: CPT

## 2024-10-10 PROCEDURE — 85025 COMPLETE CBC W/AUTO DIFF WBC: CPT

## 2024-10-10 PROCEDURE — 85610 PROTHROMBIN TIME: CPT

## 2024-10-10 PROCEDURE — C1760: CPT

## 2024-10-10 RX ORDER — SODIUM CHLORIDE 0.9 % (FLUSH) 0.9 %
3 SYRINGE (ML) INJECTION EVERY 8 HOURS
Refills: 0 | Status: DISCONTINUED | OUTPATIENT
Start: 2024-10-10 | End: 2024-10-11

## 2024-10-10 RX ORDER — PANTOPRAZOLE SODIUM 40 MG/1
40 TABLET, DELAYED RELEASE ORAL
Refills: 0 | Status: DISCONTINUED | OUTPATIENT
Start: 2024-10-10 | End: 2024-10-11

## 2024-10-10 RX ORDER — FAMOTIDINE 40 MG
20 TABLET ORAL
Refills: 0 | Status: DISCONTINUED | OUTPATIENT
Start: 2024-10-10 | End: 2024-10-10

## 2024-10-10 RX ORDER — ACETAMINOPHEN 325 MG
650 TABLET ORAL EVERY 6 HOURS
Refills: 0 | Status: DISCONTINUED | OUTPATIENT
Start: 2024-10-10 | End: 2024-10-11

## 2024-10-10 RX ORDER — SODIUM CHLORIDE 0.9 % (FLUSH) 0.9 %
1000 SYRINGE (ML) INJECTION
Refills: 0 | Status: DISCONTINUED | OUTPATIENT
Start: 2024-10-10 | End: 2024-10-11

## 2024-10-10 RX ORDER — INFLUENZA VIRUS VACCINE 15; 15; 15; 15 UG/.5ML; UG/.5ML; UG/.5ML; UG/.5ML
0.5 SUSPENSION INTRAMUSCULAR ONCE
Refills: 0 | Status: COMPLETED | OUTPATIENT
Start: 2024-10-10 | End: 2024-10-10

## 2024-10-10 RX ORDER — AMLODIPINE BESYLATE 5 MG
5 TABLET ORAL DAILY
Refills: 0 | Status: DISCONTINUED | OUTPATIENT
Start: 2024-10-10 | End: 2024-10-10

## 2024-10-10 RX ORDER — AMLODIPINE BESYLATE 5 MG
1 TABLET ORAL
Qty: 30 | Refills: 0
Start: 2024-10-10 | End: 2024-11-08

## 2024-10-10 RX ORDER — ASPIRIN 325 MG
1 TABLET ORAL
Qty: 30 | Refills: 0
Start: 2024-10-10 | End: 2024-11-08

## 2024-10-10 RX ORDER — ASPIRIN 325 MG
81 TABLET ORAL DAILY
Refills: 0 | Status: DISCONTINUED | OUTPATIENT
Start: 2024-10-10 | End: 2024-10-11

## 2024-10-10 RX ORDER — SODIUM CHLORIDE 0.9 % (FLUSH) 0.9 %
3 SYRINGE (ML) INJECTION EVERY 8 HOURS
Refills: 0 | Status: DISCONTINUED | OUTPATIENT
Start: 2024-10-10 | End: 2024-10-10

## 2024-10-10 RX ORDER — SODIUM CHLORIDE 0.9 % (FLUSH) 0.9 %
1000 SYRINGE (ML) INJECTION ONCE
Refills: 0 | Status: COMPLETED | OUTPATIENT
Start: 2024-10-10 | End: 2024-10-10

## 2024-10-10 RX ORDER — PANTOPRAZOLE SODIUM 40 MG/1
1 TABLET, DELAYED RELEASE ORAL
Qty: 30 | Refills: 0
Start: 2024-10-10 | End: 2024-11-08

## 2024-10-10 RX ORDER — ACETAMINOPHEN 325 MG
2 TABLET ORAL
Qty: 240 | Refills: 0
Start: 2024-10-10 | End: 2024-11-08

## 2024-10-10 RX ADMIN — Medication 5000 UNIT(S): at 21:42

## 2024-10-10 RX ADMIN — PANTOPRAZOLE SODIUM 40 MILLIGRAM(S): 40 TABLET, DELAYED RELEASE ORAL at 06:10

## 2024-10-10 RX ADMIN — Medication 5000 UNIT(S): at 06:10

## 2024-10-10 RX ADMIN — CHLORHEXIDINE GLUCONATE ORAL RINSE 1 APPLICATION(S): 1.2 SOLUTION DENTAL at 06:18

## 2024-10-10 RX ADMIN — Medication 5 MILLIGRAM(S): at 06:28

## 2024-10-10 RX ADMIN — Medication 50 MILLILITER(S): at 16:39

## 2024-10-10 RX ADMIN — Medication 5000 UNIT(S): at 15:18

## 2024-10-10 RX ADMIN — Medication 3 MILLILITER(S): at 21:33

## 2024-10-10 RX ADMIN — Medication 500 MILLILITER(S): at 18:50

## 2024-10-10 RX ADMIN — Medication 1000 MILLILITER(S): at 15:20

## 2024-10-10 RX ADMIN — Medication 3 MILLILITER(S): at 15:19

## 2024-10-10 RX ADMIN — Medication 100 MILLIGRAM(S): at 06:10

## 2024-10-10 NOTE — H&P ADULT - NSHPREVIEWOFSYSTEMS_GEN_ALL_CORE
Review of Systems  CONSTITUTIONAL:  Denies Fevers / chills, sweats, fatigue, weight loss, weight gain                                      NEURO:  Denies parethesias, seizures, syncope, confusion                                                                                EYES:  Denies Blurry vision, discharge, pain, loss of vision                                                                                    ENMT:  Denies Difficulty hearing, vertigo, dysphagia, epistaxis, recent dental work                                       CV:  Denies Chest pain, palpitations, BURCH, orthopnea                                                                                          RESPIRATORY:  Denies Wheezing, SOB, cough / sputum, hemoptysis                                                                GI:  Denies Nausea, vomiting, diarrhea, constipation, melena, difficulty swallowing                                               : Denies Hematuria, dysuria, urgency, incontinence                                                                                         MUSCULOSKELETAL:  Denies arthritis, joint swelling, muscle weakness                                                             SKIN/BREAST:  Denies rash, itching, hair loss, masses                                                                                            PSYCH:  Denies depression, anxiety, suicidal ideation                                                                                               HEME/LYMPH:  Denies bruises easily, enlarged lymph nodes, tender lymph nodes                                        ENDOCRINE:  Denies cold intolerance, heat intolerance, polydipsia

## 2024-10-10 NOTE — DISCHARGE NOTE PROVIDER - NSDCHHBASESERVICE_GEN_ALL_CORE
AVS printed and handed to patient by bedside nurse. VN reviewed discharge instructions with patient using teachback method.  Allowed time for questions, all questions answered.  Patient verbalized complete understanding of discharge instructions. Patient reports left leg pain that has been present since admission that has not been evaluated. NP Luis Manuel Reyes notified via secure chat. Patient is also reporting that he has been unable to find a ride home and will have to walk on his hurt leg. Notified  Krysta via secure chat to assist with transportation issues.  Discharge instructions complete.  Bedside nurse notified.     Nursing

## 2024-10-10 NOTE — DISCHARGE NOTE NURSING/CASE MANAGEMENT/SOCIAL WORK - NSDCPEFALRISK_GEN_ALL_CORE
For information on Fall & Injury Prevention, visit: [email or web address] OR  [email or web address] OR  https://www.cdc.gov/steadi/patient.html

## 2024-10-10 NOTE — DISCHARGE NOTE PROVIDER - NSDCFUSCHEDAPPT_GEN_ALL_CORE_FT
[facility] Physician [facility]  [facility] [address]  Scheduled Appointment: 10/18/2024

## 2024-10-10 NOTE — ED PROVIDER NOTE - NSICDXPASTMEDICALHX_GEN_ALL_CORE_FT
No PAST MEDICAL HISTORY:  CA skin, basal cell     Osteoarthritis sylvia knees    Osteoporosis     S/P cataract surgery     Stage 3 chronic kidney disease     Vertigo

## 2024-10-10 NOTE — DISCHARGE NOTE PROVIDER - PROVIDER TOKENS
PROVIDER:[TOKEN:[[id number]],SCHEDULEDAPPT:[10/18/2024],SCHEDULEDAPPTTIME:[09:30 AM]],PROVIDER:[TOKEN:[[id number]],FOLLOWUP:[2 weeks]]

## 2024-10-10 NOTE — DISCHARGE NOTE PROVIDER - NSDCCPTREATMENT_GEN_ALL_CORE_FT
PRINCIPAL PROCEDURE  Procedure: TAVR, percutaneous  Findings and Treatment: LHC/TF TAVR by TORIBIO with 29mm Corevalve and 18mm Zmed pre-dilation

## 2024-10-10 NOTE — ED PROVIDER NOTE - IV ALTEPLASE EXCL ABS HIDDEN
[address]           Cardiology                                                                Progress Note    Admission date:  10/27/2020    Subjective:   CC AF  HPI pt ambulatory, feels well. She describes intermittent palpitations. Rhythm has been regular AT with mostly 2:1 AV conduction and  bpm. Pt describes rectal bleeding 11/3/20. GI evaluation demonstrates hemorrhoids as the likely source of bleeding. Vitals:  Blood pressure 121/83, pulse 111, temperature 97.5 °F (36.4 °C), temperature source Oral, resp. rate 18, height 5' 3\" (1.6 m), weight 137 lb 6.4 oz (62.3 kg), SpO2 99 %.   Temp  Av.6 °F (36.4 °C)  Min: 97.5 °F (36.4 °C)  Max: 97.7 °F (36.5 °C)  Pulse  Av.1  Min: 92  Max: 112  BP  Min: 121/83  Max: 142/96  SpO2  Av.6 %  Min: 95 %  Max: 100 %    24 hour I/O    Intake/Output Summary (Last 24 hours) at 2020 1724  Last data filed at 2020 1425  Gross per 24 hour   Intake --   Output 1242 ml   Net -1242 ml       Objective:     Telemetry monitor: persistent AT    Physical Exam:  General Appearance:  comfortable  HEENT: pupils equal and reactive, no scleral  icterus  Skin:  Warm and dry  Heart:  regular, tachy, normal apex, S1 and S2 normal  Lungs:  Clear, no wheezing  Abd: soft, non tender  Extremities:  No edema, no cyanosis  Psych: normal affect, oriented X 3      Lab Review     Renal Profile:   Lab Results   Component Value Date    CREATININE 1.2 2020    BUN 31 2020     2020    K 4.5 2020     2020    CO2 30 2020     CBC:    Lab Results   Component Value Date    WBC 10.1 2020    RBC 4.53 2020    HGB 14.2 2020    HCT 42.6 2020    MCV 93.9 2020    RDW 14.8 2020     2020     BNP:    Lab Results   Component Value Date    .4 10/10/2012     Fasting Lipid Panel:  No results found for: CHOL, HDL, TRIG  Cardiac Enzymes:  CK/MbTroponin  Lab Results Component Value Date    TROPONINI 0.02 10/28/2020     PT/ INR   Lab Results   Component Value Date    INR 1.50 11/05/2020    INR 1.71 11/04/2020    INR 1.77 11/03/2020    PROTIME 17.5 11/05/2020    PROTIME 19.9 11/04/2020    PROTIME 20.7 11/03/2020     PTT No results found for: PTT   Lab Results   Component Value Date    MG 1.80 10/30/2020      Lab Results   Component Value Date    TSH 1.60 02/10/2012       Assessment:     1. AT- persistent with mostly 2:1 AV conduction,  on po amiodarone, diltiazem and metoprolol. The amiodarone has resulted in gradual improvement in the AT rate, though 2:1 conduction persists. 2. CAP- improved  3. CHF- acute on chronic  4. Cardiomyopathy  5. LBBB  6. Rectal bleeding- sigmoidoscopy planned    Plan:     1. Resume warfarin  2. HR is adequately controlled to permit outpt management now.   3. EP F/U in 1-2 weeks          Mary Guan MD show

## 2024-10-10 NOTE — DISCHARGE NOTE PROVIDER - NSDCFUADDINST_GEN_ALL_CORE_FT
You had a MCOT monitor (an external cardiac rhythm monitoring device) placed on your day of discharge.  This helps us monitor your heart while you are out of the hospital for 30 days after discharge. Should your heart go into an abnormal or dangerous rhythm you will receieve a call from the MCOT team and your Structural Heart team of Doctors and PA's will be notified.    1. Keep the monitor within 30 feet of you at all times.  2. When you feel any symptom (chest pain, dizziness, palpitations, weakness, fatigue or anything outside of your normal), press the “Record Symptoms” button on the main phone of your phone  3. Shower or exercise as normal whilewearing the MCOT Patch. Do not swim or take a bath. Patch is water-resistant, not waterproof  4. When the battery is low on the phone or on the device, use the supplied . The monitor will show a warning message when the battery is low.  5. Do not remove the patch from yourskin after you begin monitoring. With normal wear, each patch should last 5 days. To replace the patch follow instructions in the MCOT box with the Patch Guide  6. Any issues with the MCOT device or phone please call Customer Service at [phone].  7. If you have any other questions at all please call the Structural Heart office at [phone]    -Walk daily as tolerated and use your incentive spirometer 10 times every hour while you are awake.     -Please weigh yourself daily. If you notice over a 3 pound weight gain in 3 days, this is a sign you are likely retaining too much fluid. It is imperative you call our right away with unexplained rapid weight gain.      -Please continue to wear the compression stockings given to you in the hospital at home. This is a way to prevent fluid from building up in your legs.     -No driving or strenuous activity/exercise until cleared by your surgeon.    -Gently clean your incisions with unscented/antibacterial soap and water, pat dry.  You may leave them open to air.    -Call your doctor if you have shortness of breath, chest pain not relieved by pain medication, dizziness, fever >100.5, or increased redness or drainage from incisions.

## 2024-10-10 NOTE — DISCHARGE NOTE PROVIDER - HOSPITAL COURSE
Patient discussed on morning rounds with Dr. 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  Patient was determined to be a candidate for TAVR.  On 10/9/24 patient underwent TF TAVR with 29mm corevalve.  Intraop he did widen his QRS which then did narrow.  Patient was brought to [facility] as mini-icu with Left groin TVP in place.  POD 1 QRS remained narrow therefore TVP removed.  ECHO showed normal function of tavr valve without pericardial effusion.  Patient ambulating independently with room air, tolerating diet, pain controlled, urinating and having bowel movements.  Patient for discharge home.  _ _ _ _ _ _ _ _ _ _ _ _     DISCHARGE PHYSICAL EXAM:   Appearance: No acute distress.  Neurologic: AAOx3, no AMS or focal deficits.  Responds appropriately to verbal and physical stimuli; exhibits purposeful movement in all extremities.  HEENT:   MMM, PERRLA, EOMI	b/l  Neck: Supple  Cardiovascular: RRR, S1 S2. No m/r/g.  Respiratory: No acute respiratory distress. CTA b/l, no w/r/r.   Gastrointestinal:  Soft, non-tender, non-distended, + BS.	  Skin: No rashes. No ecchymoses. No cyanosis.  Extremities: Exhibits normal range of motion, no clubbing, cyanosis or edema.  Vascular: Peripheral pulses palpable 2+ bilaterally.  Groin sites: bilateral groin sites and right radial site c/d/i.  _ _ _ _ _ _ _ _ _ _ _ _   REMOVAL CHECKLIST:         [X ] TVP  _ _ _ _ _ _ _ _ _ _ _ _   MEDICATION DISCHARGE CHECKLIST       TAVR         [X] Aspirin, [  ] Contraindicated, Reason:         [ ] Plavix, [ ] Contraindicated, Reason:     _ _ _ _ _ _ _ _ _ _ _   RELEVANT LABS/IMAGING:     < from: TTE Echo Complete w/ Contrast w/ Doppler (10.10.24 @ 09:14) >    1. Normal left ventricular size and systolic function.   2. Normal right ventricular size and systolic function.   3. Normal atria.   4. 29mm Evolut Fx+ valve is seen in the aortic position with apparent   normal function, without evidence of prosthetic dysfunction.   5. No evidence of pulmonary hypertension.   6. No pericardial effusion.   7. Compared to the previous TTE performed on 10/9/2024, there have been   no significant interval changes.    < end of copied text >    _ _ _ _ _ _ _ _ _ _ _   CLINICAL FOLLOW UP NEEDS:      [ ] Lab work needed:      [ ] Imaging needed:      [ X] Home equipment            Type: MCOT  _ _ _ _ _ _ _ _ _ _ _ _   Over 35 minutes was spent with the patient reviewing the discharge material including medications, follow up appointments, recovery, concerning symptoms, and how to contact their health care providers if they have questions. Patient discussed on morning rounds with Dr. 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  Patient was determined to be a candidate for TAVR.  On 10/9/24 patient underwent TF TAVR with 29mm corevalve.  Intraop he did widen his QRS which then did narrow.  Patient was brought to [facility] as mini-icu with Left groin TVP in place.  POD 1 QRS remained narrow therefore TVP removed.  ECHO showed normal function of tavr valve without pericardial effusion.  Patient ambulating independently with room air, tolerating diet, pain controlled, urinating and having bowel movements.  Patient for discharge home.  _ _ _ _ _ _ _ _ _ _ _ _     DISCHARGE PHYSICAL EXAM:   Appearance: No acute distress.  Neurologic: AAOx3, no AMS or focal deficits.  Responds appropriately to verbal and physical stimuli; exhibits purposeful movement in all extremities.  HEENT:   MMM, PERRLA, EOMI	b/l  Neck: Supple  Cardiovascular: RRR, S1 S2. No m/r/g.  Respiratory: No acute respiratory distress. CTA b/l, no w/r/r.   Gastrointestinal:  Soft, non-tender, non-distended, + BS.	  Skin: No rashes. No ecchymoses. No cyanosis.  Extremities: Exhibits normal range of motion, no clubbing, cyanosis or edema.  Vascular: Peripheral pulses palpable 2+ bilaterally.  Groin sites: bilateral groin sites and right radial site c/d/i.  _ _ _ _ _ _ _ _ _ _ _ _   REMOVAL CHECKLIST:         [X ] TVP  _ _ _ _ _ _ _ _ _ _ _ _   MEDICATION DISCHARGE CHECKLIST       TAVR         [X] Aspirin, [  ] Contraindicated, Reason:         [ ] Plavix, [ ] Contraindicated, Reason:    _ _ _ _ _ _ _ _ _ _ _   RELEVANT LABS/IMAGING:     < from: TTE Echo Complete w/ Contrast w/ Doppler (10.10.24 @ 09:14) >    1. Normal left ventricular size and systolic function.   2. Normal right ventricular size and systolic function.   3. Normal atria.   4. 29mm Evolut Fx+ valve is seen in the aortic position with apparent   normal function, without evidence of prosthetic dysfunction.   5. No evidence of pulmonary hypertension.   6. No pericardial effusion.   7. Compared to the previous TTE performed on 10/9/2024, there have been   no significant interval changes.    < end of copied text >    _ _ _ _ _ _ _ _ _ _ _   CLINICAL FOLLOW UP NEEDS:      [ ] Lab work needed:      [ ] Imaging needed:      [ X] Home equipment            Type: MCOT  _ _ _ _ _ _ _ _ _ _ _ _   Over 35 minutes was spent with the patient reviewing the discharge material including medications, follow up appointments, recovery, concerning symptoms, and how to contact their health care providers if they have questions.

## 2024-10-10 NOTE — ED PROVIDER NOTE - CLINICAL SUMMARY MEDICAL DECISION MAKING FREE TEXT BOX
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  He underwent a TF TAVR with 29 mm CoreValve. He did widen his QRS intraoperatively but was narrowed by POD 1. On POD 1, his TVP was removed and echo was without issue. He was discharged home; however. upon reaching lobby patient experiencing blacking out of vision and passed out. A rapid response was called and he was brought to the ED.  Pt started on a new blood pressure med today - amlodipine 5mg.  Pt really did not eat or drink much in past two days.  When lying down in ED, pt states he is feeling better and is asymptomatic.  CT surgery at bedside.    VSS  PE = wnl  EKG nonischemic  Labs wnl  1 L IVFs and admitted to CT surgery for syncope episode

## 2024-10-10 NOTE — DISCHARGE NOTE PROVIDER - CARE PROVIDER_API CALL
Benny Staley  Interventional Cardiology  [address]  [place] [address]  Phone: [phone]  Fax: [phone]  Scheduled Appointment: 10/18/2024 09:30 AM    Bautista Ni  Cardiology  [address] [place] [address]  Phone: [phone]  Fax: [phone]  Follow Up Time: 2 weeks

## 2024-10-10 NOTE — H&P ADULT - HISTORY OF PRESENT ILLNESS
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  He underwent a TF TAVR with 29 mm CoreValve. He did widen his QRS intraoperatively but was narrowed by POD 1. On POD 1, his TVP was removed and echo was without issue. He was discharged home; however. upon reaching lobby patient experiencing blacking out of vision and passed out. A rapid response was called and he was brought to the ED. He is now admitted under Dr. Staley for observation after this episode.

## 2024-10-10 NOTE — ED CLERICAL - NS ED CLERK NOTE PRE-ARRIVAL INFORMATION; ADDITIONAL PRE-ARRIVAL INFORMATION
This patient is enrolled in the Follow Your Heart program and has undergone a cardiac surgery procedure and has active care navigation. This patient can be followed up by the care navigation team within 24 hours. To arrange close follow-up or to obtain additional clinical information about this patient, please call the contact number above. Please call the cardiac surgery team once patient is registered at [phone] for consultation PRIOR to disposition decision.  The patient recently underwent a cardiac surgery procedure and the team can assist in acute medical management.

## 2024-10-10 NOTE — DISCHARGE NOTE PROVIDER - CARE PROVIDERS DIRECT ADDRESSES
[email or web address]

## 2024-10-10 NOTE — H&P ADULT - ASSESSMENT
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  He underwent a TF TAVR with 29 mm CoreValve. He did widen his QRS intraoperatively but was narrowed by POD 1. On POD 1, his TVP was removed and echo was without issue. He was discharged home; however. upon reaching [facility] patient experiencing blacking out of vision and passed out. A rapid response was called and he was brought to the ED. He is now admitted under Dr. Staley for observation after this episode.     Neuro:   Pain control with PRN APAP  No delirium, no focal deficits     Cardiac:   Syncope   - monitor on telemetry   - event report requested from [facility] -- pending to see if there is rhythm issues   POD 1 s/p TF TAVr (29 mm Shauna)   - post-op echo complete without evidence of AI or pericardial effusion   - widened QRS intra-op, narrowed and TVP removed however experiencing syncopal episode in [facility] upon discharge   - continue to monitor hear rhythm   - follow-up MCOT strips   Monitor Vital Signs on Telemetry     Pulm:   Saturating well on room air   Encouraging IS   CXR: wnl on discharge     GI:   Tolerating diet well   GI PPx: Protonix  Continue with bowel regimen     Renal:   CKD 3   - baseline Cr 1.7, Cr on discharge 1.24  - f/u repeat labs   Monitor I/Os     Heme:   H&H on discharge  13.0/37.2  - f/u repeat values   DVT prophylaxis: SCDs and SQH 5K     ID:   Afebrile  Monitor fever curve     MSK:   Encourage ambulation   PT/OT     Endocrine:   No Hx of DM or Thyroid Disease  - A1C: not obtained   - TSH: not obtained   Monitor blood glucose levels     Dispo:  Admit to obs    79M, former smoker, with PMH of skin CA (basal cell of chest), vertigo, OA of b/l knees (L >R), stage 3 CKD (recent Cr 1.70) who had a recent episode of dizziness went to a local urgent care with exam notable for a heart murmur and was found to have LFLG severe AS.  He underwent a TF TAVR with 29 mm CoreValve. He did widen his QRS intraoperatively but was narrowed by POD 1. On POD 1, his TVP was removed and echo was without issue. He was discharged home; however. upon reaching [facility] patient experiencing blacking out of vision and passed out. A rapid response was called and he was brought to the ED. He is now admitted under Dr. Staley for observation after this episode.     Neuro:   Pain control with PRN APAP  No delirium, no focal deficits     Cardiac:   Syncope   - monitor on telemetry   - event report requested from [facility] -- pending to see if there is rhythm issues   POD 1 s/p TF TAVr (29 mm Shauna)   - post-op echo complete without evidence of AI or pericardial effusion   - widened QRS intra-op, narrowed and TVP removed however experiencing syncopal episode in [facility] upon discharge   - continue to monitor hear rhythm   - follow-up MCOT strips   HTN  - holding amlodipine 5 mg given syncopal episode   Monitor Vital Signs on Telemetry     Pulm:   Saturating well on room air   Encouraging IS   CXR: wnl on discharge     GI:   Tolerating diet well   GI PPx: Protonix  Continue with bowel regimen     Renal:   CKD 3   - baseline Cr 1.7, Cr on discharge 1.24  - f/u repeat labs   Monitor I/Os     Heme:   H&H on discharge  13.0/37.2  - f/u repeat values   DVT prophylaxis: SCDs and SQH 5K     ID:   Afebrile  Monitor fever curve     MSK:   Encourage ambulation   PT/OT     Endocrine:   No Hx of DM or Thyroid Disease  - A1C: not obtained   - TSH: not obtained   Monitor blood glucose levels     Dispo:  Admit to obs

## 2024-10-10 NOTE — PATIENT PROFILE ADULT - FUNCTIONAL ASSESSMENT - BASIC MOBILITY 6.
3-calculated by average/Not able to assess (calculate score using [facility] averaging method)

## 2024-10-10 NOTE — DISCHARGE NOTE PROVIDER - NSDCCPCAREPLAN_GEN_ALL_CORE_FT
PRINCIPAL DISCHARGE DIAGNOSIS  Diagnosis: Severe aortic stenosis  Assessment and Plan of Treatment:       SECONDARY DISCHARGE DIAGNOSES  Diagnosis: Stage 3 chronic kidney disease  Assessment and Plan of Treatment:

## 2024-10-10 NOTE — H&P ADULT - NSHPPHYSICALEXAM_GEN_ALL_CORE
Appearance: No acute distress.  Neurologic: AAOx3, no AMS or focal deficits.  Responds appropriately to verbal and physical stimuli; exhibits purposeful movement in all extremities.  HEENT:   MMM, PERRLA, EOMI	b/l  Neck: Supple  Cardiovascular: RRR, S1 S2. No m/r/g.  Respiratory: No acute respiratory distress. CTA b/l, no w/r/r.   Gastrointestinal:  Soft, non-tender, non-distended, + BS.	  Skin: No rashes. No ecchymoses. No cyanosis.  Extremities: Exhibits normal range of motion, no clubbing, cyanosis or edema.  Vascular: Peripheral pulses palpable 2+ bilaterally.  Groin sites: bilateral groin sites and right radial site c/d/i.

## 2024-10-10 NOTE — ED PROVIDER NOTE - DISPOSITION TYPE
ADMIT
Detail Level: Zone
Plan: If areas continue to be of concern may consider laser treatment.
Continue Regimen: Sun Screen daily with SPF of 30 or higher with re -application every 2 hours.  Wear sun protective clothing.

## 2024-10-10 NOTE — H&P ADULT - NSICDXPASTMEDICALHX_GEN_ALL_CORE_FT
PAST MEDICAL HISTORY:  CA skin, basal cell     Osteoarthritis sylvia knees    Osteoporosis     S/P cataract surgery     Stage 3 chronic kidney disease     Vertigo

## 2024-10-10 NOTE — ED PROVIDER NOTE - OBJECTIVE STATEMENT
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  He underwent a TF TAVR with 29 mm CoreValve. He did widen his QRS intraoperatively but was narrowed by POD 1. On POD 1, his TVP was removed and echo was without issue. He was discharged home; however. upon reaching lobby patient experiencing blacking out of vision and passed out. A rapid response was called and he was brought to the ED.  Pt started on a new blood pressure med today - amlodipine 5mg.  Pt really did not eat or drink much in past two days.  When lying down in ED, pt states he is feeling better and is asymptomatic.  CT surgery at bedside.

## 2024-10-10 NOTE — DISCHARGE NOTE NURSING/CASE MANAGEMENT/SOCIAL WORK - PATIENT PORTAL LINK FT
You can access the FollowMyHealth Patient Portal offered by [facility] by registering at the following website: [email or web address]. By joining [facility]’s FollowMyHealth portal, you will also be able to view your health information using other applications (apps) compatible with our system.

## 2024-10-10 NOTE — ED ADULT TRIAGE NOTE - OTHER COMPLAINTS
pt had TAVR yesterday. pt outside with PCA awaiting for wife to pickup. pt became lightheaded with generalized weakness. no syncope. pt denies headache or pain. pt appears pale, BP low. ekg in progress.

## 2024-10-10 NOTE — DISCHARGE NOTE PROVIDER - NSDCMRMEDTOKEN_GEN_ALL_CORE_FT
acetaminophen 325 mg oral tablet: 2 tab(s) orally every 6 hours as needed for Mild Pain (1 - 3)  amLODIPine 5 mg oral tablet: 1 tab(s) orally once a day  aspirin 81 mg oral delayed release tablet: 1 tab(s) orally once a day  polyethylene glycol 3350 oral powder for reconstitution: 17 gram(s) orally once a day  Protonix 40 mg oral delayed release tablet: 1 tab(s) orally once a day

## 2024-10-11 ENCOUNTER — NON-APPOINTMENT (OUTPATIENT)
Age: 79
End: 2024-10-11

## 2024-10-11 ENCOUNTER — TRANSCRIPTION ENCOUNTER (OUTPATIENT)
Age: 79
End: 2024-10-11

## 2024-10-11 VITALS — DIASTOLIC BLOOD PRESSURE: 64 MMHG | OXYGEN SATURATION: 96 % | HEART RATE: 68 BPM | SYSTOLIC BLOOD PRESSURE: 141 MMHG

## 2024-10-11 LAB
ANION GAP SERPL CALC-SCNC: 10 MMOL/L — SIGNIFICANT CHANGE UP (ref 5–17)
BUN SERPL-MCNC: 16 MG/DL — SIGNIFICANT CHANGE UP (ref 7–23)
CALCIUM SERPL-MCNC: 8.6 MG/DL — SIGNIFICANT CHANGE UP (ref 8.4–10.5)
CHLORIDE SERPL-SCNC: 105 MMOL/L — SIGNIFICANT CHANGE UP (ref 96–108)
CO2 SERPL-SCNC: 24 MMOL/L — SIGNIFICANT CHANGE UP (ref 22–31)
CREAT SERPL-MCNC: 1.42 MG/DL — HIGH (ref 0.5–1.3)
EGFR: 50 ML/MIN/1.73M2 — LOW
GLUCOSE SERPL-MCNC: 102 MG/DL — HIGH (ref 70–99)
HCT VFR BLD CALC: 34.7 % — LOW (ref 39–50)
HGB BLD-MCNC: 11.8 G/DL — LOW (ref 13–17)
MAGNESIUM SERPL-MCNC: 1.8 MG/DL — SIGNIFICANT CHANGE UP (ref 1.6–2.6)
MCHC RBC-ENTMCNC: 33.9 PG — SIGNIFICANT CHANGE UP (ref 27–34)
MCHC RBC-ENTMCNC: 34 GM/DL — SIGNIFICANT CHANGE UP (ref 32–36)
MCV RBC AUTO: 99.7 FL — SIGNIFICANT CHANGE UP (ref 80–100)
NRBC # BLD: 0 /100 WBCS — SIGNIFICANT CHANGE UP (ref 0–0)
PHOSPHATE SERPL-MCNC: 2.5 MG/DL — SIGNIFICANT CHANGE UP (ref 2.5–4.5)
PLATELET # BLD AUTO: 82 K/UL — LOW (ref 150–400)
POTASSIUM SERPL-MCNC: 3.9 MMOL/L — SIGNIFICANT CHANGE UP (ref 3.5–5.3)
POTASSIUM SERPL-SCNC: 3.9 MMOL/L — SIGNIFICANT CHANGE UP (ref 3.5–5.3)
RBC # BLD: 3.48 M/UL — LOW (ref 4.2–5.8)
RBC # FLD: 13.2 % — SIGNIFICANT CHANGE UP (ref 10.3–14.5)
SODIUM SERPL-SCNC: 139 MMOL/L — SIGNIFICANT CHANGE UP (ref 135–145)
WBC # BLD: 6.13 K/UL — SIGNIFICANT CHANGE UP (ref 3.8–10.5)
WBC # FLD AUTO: 6.13 K/UL — SIGNIFICANT CHANGE UP (ref 3.8–10.5)

## 2024-10-11 PROCEDURE — 83735 ASSAY OF MAGNESIUM: CPT

## 2024-10-11 PROCEDURE — 85025 COMPLETE CBC W/AUTO DIFF WBC: CPT

## 2024-10-11 PROCEDURE — P9045: CPT

## 2024-10-11 PROCEDURE — 93005 ELECTROCARDIOGRAM TRACING: CPT

## 2024-10-11 PROCEDURE — 84100 ASSAY OF PHOSPHORUS: CPT

## 2024-10-11 PROCEDURE — 36415 COLL VENOUS BLD VENIPUNCTURE: CPT

## 2024-10-11 PROCEDURE — 80048 BASIC METABOLIC PNL TOTAL CA: CPT

## 2024-10-11 PROCEDURE — 85027 COMPLETE CBC AUTOMATED: CPT

## 2024-10-11 PROCEDURE — 71045 X-RAY EXAM CHEST 1 VIEW: CPT

## 2024-10-11 PROCEDURE — 99285 EMERGENCY DEPT VISIT HI MDM: CPT

## 2024-10-11 PROCEDURE — 71045 X-RAY EXAM CHEST 1 VIEW: CPT | Mod: 26

## 2024-10-11 RX ORDER — PSYLLIUM HUSK 0.4 G
800 CAPSULE ORAL ONCE
Refills: 0 | Status: COMPLETED | OUTPATIENT
Start: 2024-10-11 | End: 2024-10-11

## 2024-10-11 RX ADMIN — Medication 81 MILLIGRAM(S): at 11:41

## 2024-10-11 RX ADMIN — Medication 20 MILLIEQUIVALENT(S): at 07:53

## 2024-10-11 RX ADMIN — Medication 800 MILLIGRAM(S): at 07:53

## 2024-10-11 RX ADMIN — Medication 5000 UNIT(S): at 05:37

## 2024-10-11 RX ADMIN — Medication 3 MILLILITER(S): at 05:39

## 2024-10-11 RX ADMIN — PANTOPRAZOLE SODIUM 40 MILLIGRAM(S): 40 TABLET, DELAYED RELEASE ORAL at 05:37

## 2024-10-11 NOTE — DISCHARGE NOTE PROVIDER - NSDCCPCAREPLAN_GEN_ALL_CORE_FT
PRINCIPAL DISCHARGE DIAGNOSIS  Diagnosis: Syncope  Assessment and Plan of Treatment:       SECONDARY DISCHARGE DIAGNOSES  Diagnosis: Severe aortic stenosis  Assessment and Plan of Treatment:

## 2024-10-11 NOTE — DISCHARGE NOTE PROVIDER - CARE PROVIDERS DIRECT ADDRESSES
[email or web address]

## 2024-10-11 NOTE — DISCHARGE NOTE NURSING/CASE MANAGEMENT/SOCIAL WORK - NSDCPEFALRISK_GEN_ALL_CORE
For information on Fall & Injury Prevention, visit: [email or web address] OR  [email or web address] OR  https://www.cdc.gov/steadi/patient.html

## 2024-10-11 NOTE — DISCHARGE NOTE PROVIDER - CARE PROVIDER_API CALL
Benny Staley  Interventional Cardiology  [address]  [place] [address]  Phone: [phone]  Fax: [phone]  Scheduled Appointment: 10/18/2024 09:00 AM    Bautista Ni  Cardiology  [address] [place] [address]  Phone: [phone]  Fax: [phone]  Follow Up Time: 2 weeks

## 2024-10-11 NOTE — DISCHARGE NOTE PROVIDER - NSDCMRMEDTOKEN_GEN_ALL_CORE_FT
acetaminophen 325 mg oral tablet: 2 tab(s) orally every 6 hours as needed for Mild Pain (1 - 3)  aspirin 81 mg oral delayed release tablet: 1 tab(s) orally once a day  polyethylene glycol 3350 oral powder for reconstitution: 17 gram(s) orally once a day  Protonix 40 mg oral delayed release tablet: 1 tab(s) orally once a day

## 2024-10-11 NOTE — DISCHARGE NOTE PROVIDER - HOSPITAL COURSE
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  Patient was determined to be a candidate for TAVR.  On 10/9/24 patient underwent TF TAVR with 29mm corevalve.  Intraop he did widen his QRS which then did narrow.  Patient was brought to [facility] as mini-icu with Left groin TVP in place.  POD 1 QRS remained narrow therefore TVP removed.  ECHO showed normal function of tavr valve without pericardial effusion.  Patient was discharged.  However, when patient was in the lobby, patient had syncopal episode.  Rapid response was called.  Patient was evaluated in ED with SBP in 70s, complaining he felt lightheaded.  Patient given 1L IVF bolus.  Orthostatics performed post bolus were positive.  Therefore patient was started on maintaince fluids.  MCOT did not show any rhythm issues.   POD 2 orthostatic vitals were negative.  Patient ambulating the unit several times without issues.  Patient ambulating independently with room air, tolerating diet, pain controlled, urinating and having bowel movements.  Patient for discharge home.    DISCHARGE PHYSICAL EXAM:   Appearance: No acute distress.  Neurologic: AAOx3, no AMS or focal deficits.  Responds appropriately to verbal and physical stimuli; exhibits purposeful movement in all extremities.  HEENT:   MMM, PERRLA, EOMI	b/l  Neck: Supple  Cardiovascular: RRR, S1 S2. No m/r/g.  Respiratory: No acute respiratory distress. CTA b/l, no w/r/r.   Gastrointestinal:  Soft, non-tender, non-distended, + BS.	  Skin: No rashes. No ecchymoses. No cyanosis.  Extremities: Exhibits normal range of motion, no clubbing, cyanosis or edema.  Vascular: Peripheral pulses palpable 2+ bilaterally.  Groin sites: bilateral groin sites and right radial site c/d/i.  _ _ _ _ _ _ _ _ _ _ _ _   REMOVAL CHECKLIST:         [X ] TVP  _ _ _ _ _ _ _ _ _ _ _ _   MEDICATION DISCHARGE CHECKLIST       TAVR         [X] Aspirin, [  ] Contraindicated, Reason:         [ ] Plavix, [ ] Contraindicated, Reason:    _ _ _ _ _ _ _ _ _ _ _   RELEVANT LABS/IMAGING:     < from: TTE Echo Complete w/ Contrast w/ Doppler (10.10.24 @ 09:14) >    1. Normal left ventricular size and systolic function.   2. Normal right ventricular size and systolic function.   3. Normal atria.   4. 29mm Evolut Fx+ valve is seen in the aortic position with apparent   normal function, without evidence of prosthetic dysfunction.   5. No evidence of pulmonary hypertension.   6. No pericardial effusion.   7. Compared to the previous TTE performed on 10/9/2024, there have been   no significant interval changes.    < end of copied text >    _ _ _ _ _ _ _ _ _ _ _   CLINICAL FOLLOW UP NEEDS:      [ ] Lab work needed:      [ ] Imaging needed:      [ X] Home equipment            Type: [facility]OT  _ _ _ _ _ _ _ _ _ _ _ _   Over 35 minutes was spent with the patient reviewing the discharge material including medications, follow up appointments, recovery, concerning symptoms, and how to contact their health care providers if they have questions.

## 2024-10-11 NOTE — DISCHARGE NOTE PROVIDER - PROVIDER TOKENS
PROVIDER:[TOKEN:[[id number]],SCHEDULEDAPPT:[10/18/2024],SCHEDULEDAPPTTIME:[09:00 AM]],PROVIDER:[TOKEN:[[id number]],FOLLOWUP:[2 weeks]]

## 2024-10-11 NOTE — DISCHARGE NOTE NURSING/CASE MANAGEMENT/SOCIAL WORK - PATIENT PORTAL LINK FT
You can access the FollowMyHealth Patient Portal offered by [facility] by registering at the following website: [email or web address]. By joining [facility]’s FollowMyHealth portal, you will also be able to view your health information using other applications (apps) compatible with our system.

## 2024-10-11 NOTE — DISCHARGE NOTE PROVIDER - NSDCFUSCHEDAPPT_GEN_ALL_CORE_FT
[facility] Physician [facility]  CTSURG [address]  Scheduled Appointment: 10/18/2024

## 2024-10-12 ENCOUNTER — TRANSCRIPTION ENCOUNTER (OUTPATIENT)
Age: 79
End: 2024-10-12

## 2024-10-14 ENCOUNTER — NON-APPOINTMENT (OUTPATIENT)
Age: 79
End: 2024-10-14

## 2024-10-14 RX ORDER — POLYETHYLENE GLYCOL 3350 17 G/17G
17 POWDER, FOR SOLUTION ORAL DAILY
Qty: 1 | Refills: 0 | Status: ACTIVE | COMMUNITY
Start: 2024-10-14

## 2024-10-14 RX ORDER — PANTOPRAZOLE 40 MG/1
40 TABLET, DELAYED RELEASE ORAL
Qty: 30 | Refills: 0 | Status: ACTIVE | COMMUNITY
Start: 2024-10-14

## 2024-10-14 RX ORDER — ASPIRIN ENTERIC COATED TABLETS 81 MG 81 MG/1
81 TABLET, DELAYED RELEASE ORAL
Qty: 30 | Refills: 0 | Status: ACTIVE | COMMUNITY
Start: 2024-10-14

## 2024-10-14 RX ORDER — ACETAMINOPHEN 325 MG/1
325 TABLET ORAL EVERY 6 HOURS
Qty: 40 | Refills: 0 | Status: ACTIVE | COMMUNITY
Start: 2024-10-14

## 2024-10-15 ENCOUNTER — NON-APPOINTMENT (OUTPATIENT)
Age: 79
End: 2024-10-15

## 2024-10-15 ENCOUNTER — APPOINTMENT (OUTPATIENT)
Dept: CARDIOTHORACIC SURGERY | Facility: CLINIC | Age: 79
End: 2024-10-15
Payer: COMMERCIAL

## 2024-10-15 VITALS
WEIGHT: 191 LBS | SYSTOLIC BLOOD PRESSURE: 166 MMHG | OXYGEN SATURATION: 97 % | BODY MASS INDEX: 24.51 KG/M2 | HEIGHT: 74 IN | HEART RATE: 85 BPM | TEMPERATURE: 97.2 F | DIASTOLIC BLOOD PRESSURE: 72 MMHG

## 2024-10-15 DIAGNOSIS — Z87.891 PERSONAL HISTORY OF NICOTINE DEPENDENCE: ICD-10-CM

## 2024-10-15 DIAGNOSIS — M19.90 UNSPECIFIED OSTEOARTHRITIS, UNSPECIFIED SITE: ICD-10-CM

## 2024-10-15 DIAGNOSIS — I35.0 NONRHEUMATIC AORTIC (VALVE) STENOSIS: ICD-10-CM

## 2024-10-15 DIAGNOSIS — Z00.6 ENCOUNTER FOR EXAMINATION FOR NORMAL COMPARISON AND CONTROL IN CLINICAL RESEARCH PROGRAM: ICD-10-CM

## 2024-10-15 DIAGNOSIS — M81.0 AGE-RELATED OSTEOPOROSIS WITHOUT CURRENT PATHOLOGICAL FRACTURE: ICD-10-CM

## 2024-10-15 DIAGNOSIS — N18.30 CHRONIC KIDNEY DISEASE, STAGE 3 UNSPECIFIED: ICD-10-CM

## 2024-10-15 DIAGNOSIS — I50.9 HEART FAILURE, UNSPECIFIED: ICD-10-CM

## 2024-10-15 DIAGNOSIS — R42 DIZZINESS AND GIDDINESS: ICD-10-CM

## 2024-10-15 DIAGNOSIS — Z72.89 OTHER PROBLEMS RELATED TO LIFESTYLE: ICD-10-CM

## 2024-10-15 PROCEDURE — 99213 OFFICE O/P EST LOW 20 MIN: CPT

## 2024-10-16 ENCOUNTER — NON-APPOINTMENT (OUTPATIENT)
Age: 79
End: 2024-10-16

## 2024-10-16 DIAGNOSIS — M17.0 BILATERAL PRIMARY OSTEOARTHRITIS OF KNEE: ICD-10-CM

## 2024-10-16 DIAGNOSIS — Z79.82 LONG TERM (CURRENT) USE OF ASPIRIN: ICD-10-CM

## 2024-10-16 DIAGNOSIS — N18.30 CHRONIC KIDNEY DISEASE, STAGE 3 UNSPECIFIED: ICD-10-CM

## 2024-10-16 DIAGNOSIS — R42 DIZZINESS AND GIDDINESS: ICD-10-CM

## 2024-10-16 DIAGNOSIS — I12.9 HYPERTENSIVE CHRONIC KIDNEY DISEASE WITH STAGE 1 THROUGH STAGE 4 CHRONIC KIDNEY DISEASE, OR UNSPECIFIED CHRONIC KIDNEY DISEASE: ICD-10-CM

## 2024-10-16 DIAGNOSIS — Z85.828 PERSONAL HISTORY OF OTHER MALIGNANT NEOPLASM OF SKIN: ICD-10-CM

## 2024-10-16 DIAGNOSIS — R55 SYNCOPE AND COLLAPSE: ICD-10-CM

## 2024-10-16 DIAGNOSIS — Z98.890 OTHER SPECIFIED POSTPROCEDURAL STATES: ICD-10-CM

## 2024-10-16 DIAGNOSIS — Z87.891 PERSONAL HISTORY OF NICOTINE DEPENDENCE: ICD-10-CM

## 2024-10-18 ENCOUNTER — NON-APPOINTMENT (OUTPATIENT)
Age: 79
End: 2024-10-18

## 2024-10-18 ENCOUNTER — APPOINTMENT (OUTPATIENT)
Dept: CARDIOTHORACIC SURGERY | Facility: CLINIC | Age: 79
End: 2024-10-18

## 2024-10-18 DIAGNOSIS — Z95.2 PRESENCE OF PROSTHETIC HEART VALVE: ICD-10-CM

## 2024-10-21 ENCOUNTER — NON-APPOINTMENT (OUTPATIENT)
Age: 79
End: 2024-10-21

## 2024-10-28 ENCOUNTER — NON-APPOINTMENT (OUTPATIENT)
Age: 79
End: 2024-10-28

## 2024-10-30 ENCOUNTER — APPOINTMENT (OUTPATIENT)
Dept: HEART AND VASCULAR | Facility: CLINIC | Age: 79
End: 2024-10-30
Payer: COMMERCIAL

## 2024-10-30 ENCOUNTER — NON-APPOINTMENT (OUTPATIENT)
Age: 79
End: 2024-10-30

## 2024-10-30 VITALS
TEMPERATURE: 97.3 F | HEIGHT: 72 IN | SYSTOLIC BLOOD PRESSURE: 142 MMHG | BODY MASS INDEX: 25.73 KG/M2 | HEART RATE: 63 BPM | DIASTOLIC BLOOD PRESSURE: 72 MMHG | RESPIRATION RATE: 16 BRPM | WEIGHT: 190 LBS | OXYGEN SATURATION: 98 %

## 2024-10-30 DIAGNOSIS — Z00.00 ENCOUNTER FOR GENERAL ADULT MEDICAL EXAMINATION W/OUT ABNORMAL FINDINGS: ICD-10-CM

## 2024-10-30 DIAGNOSIS — I35.0 NONRHEUMATIC AORTIC (VALVE) STENOSIS: ICD-10-CM

## 2024-10-30 DIAGNOSIS — Z82.49 FAMILY HISTORY OF ISCHEMIC HEART DISEASE AND OTHER DISEASES OF THE CIRCULATORY SYSTEM: ICD-10-CM

## 2024-10-30 DIAGNOSIS — Z95.2 PRESENCE OF PROSTHETIC HEART VALVE: ICD-10-CM

## 2024-10-30 DIAGNOSIS — Z80.0 FAMILY HISTORY OF MALIGNANT NEOPLASM OF DIGESTIVE ORGANS: ICD-10-CM

## 2024-10-30 DIAGNOSIS — Z87.891 PERSONAL HISTORY OF NICOTINE DEPENDENCE: ICD-10-CM

## 2024-10-30 PROCEDURE — 99205 OFFICE O/P NEW HI 60 MIN: CPT

## 2024-10-30 PROCEDURE — 93000 ELECTROCARDIOGRAM COMPLETE: CPT

## 2024-11-15 ENCOUNTER — APPOINTMENT (OUTPATIENT)
Dept: CARDIOTHORACIC SURGERY | Facility: CLINIC | Age: 79
End: 2024-11-15

## 2024-11-15 VITALS
HEIGHT: 72 IN | BODY MASS INDEX: 25.73 KG/M2 | TEMPERATURE: 98.1 F | WEIGHT: 190 LBS | HEART RATE: 64 BPM | SYSTOLIC BLOOD PRESSURE: 150 MMHG | DIASTOLIC BLOOD PRESSURE: 70 MMHG

## 2024-11-15 PROCEDURE — 99215 OFFICE O/P EST HI 40 MIN: CPT

## 2024-11-15 RX ORDER — CEPHALEXIN 500 MG/1
500 CAPSULE ORAL EVERY 6 HOURS
Qty: 20 | Refills: 0 | Status: ACTIVE | COMMUNITY
Start: 2024-11-15 | End: 1900-01-01

## 2024-12-19 ENCOUNTER — RESULT REVIEW (OUTPATIENT)
Age: 79
End: 2024-12-19

## 2025-01-16 ENCOUNTER — APPOINTMENT (OUTPATIENT)
Dept: NEPHROLOGY | Facility: CLINIC | Age: 80
End: 2025-01-16
Payer: COMMERCIAL

## 2025-01-16 VITALS — HEART RATE: 63 BPM | DIASTOLIC BLOOD PRESSURE: 78 MMHG | OXYGEN SATURATION: 99 % | SYSTOLIC BLOOD PRESSURE: 132 MMHG

## 2025-01-16 DIAGNOSIS — M1A.0710 IDIOPATHIC CHRONIC GOUT, RIGHT ANKLE AND FOOT, W/OUT TOPHUS (TOPHI): ICD-10-CM

## 2025-01-16 DIAGNOSIS — N18.32 CHRONIC KIDNEY DISEASE, STAGE 3B: ICD-10-CM

## 2025-01-16 PROCEDURE — G2211 COMPLEX E/M VISIT ADD ON: CPT | Mod: NC

## 2025-01-16 PROCEDURE — 99213 OFFICE O/P EST LOW 20 MIN: CPT

## 2025-01-16 RX ORDER — ALLOPURINOL 200 MG/1
TABLET ORAL
Refills: 0 | Status: ACTIVE | COMMUNITY

## 2025-03-06 ENCOUNTER — RESULT REVIEW (OUTPATIENT)
Age: 80
End: 2025-03-06

## 2025-03-06 ENCOUNTER — APPOINTMENT (OUTPATIENT)
Dept: HEMATOLOGY ONCOLOGY | Facility: CLINIC | Age: 80
End: 2025-03-06
Payer: COMMERCIAL

## 2025-03-06 VITALS
WEIGHT: 186.9 LBS | BODY MASS INDEX: 25.32 KG/M2 | TEMPERATURE: 97.6 F | SYSTOLIC BLOOD PRESSURE: 160 MMHG | DIASTOLIC BLOOD PRESSURE: 70 MMHG | HEIGHT: 72 IN | RESPIRATION RATE: 16 BRPM | HEART RATE: 72 BPM | OXYGEN SATURATION: 96 %

## 2025-03-06 DIAGNOSIS — Z86.39 PERSONAL HISTORY OF OTHER ENDOCRINE, NUTRITIONAL AND METABOLIC DISEASE: ICD-10-CM

## 2025-03-06 DIAGNOSIS — R79.89 OTHER SPECIFIED ABNORMAL FINDINGS OF BLOOD CHEMISTRY: ICD-10-CM

## 2025-03-06 DIAGNOSIS — D75.89 OTHER SPECIFIED DISEASES OF BLOOD AND BLOOD-FORMING ORGANS: ICD-10-CM

## 2025-03-06 DIAGNOSIS — D69.6 THROMBOCYTOPENIA, UNSPECIFIED: ICD-10-CM

## 2025-03-06 PROCEDURE — 99205 OFFICE O/P NEW HI 60 MIN: CPT

## 2025-03-06 PROCEDURE — G2211 COMPLEX E/M VISIT ADD ON: CPT | Mod: NC

## 2025-03-06 RX ORDER — ATORVASTATIN CALCIUM 20 MG/1
20 TABLET, FILM COATED ORAL
Refills: 0 | Status: ACTIVE | COMMUNITY

## 2025-03-07 PROBLEM — R79.89 ELEVATED FERRITIN: Status: ACTIVE | Noted: 2025-03-07

## 2025-03-20 ENCOUNTER — APPOINTMENT (OUTPATIENT)
Dept: HEMATOLOGY ONCOLOGY | Facility: CLINIC | Age: 80
End: 2025-03-20
Payer: COMMERCIAL

## 2025-03-20 ENCOUNTER — RESULT REVIEW (OUTPATIENT)
Age: 80
End: 2025-03-20

## 2025-03-20 VITALS
TEMPERATURE: 97.1 F | SYSTOLIC BLOOD PRESSURE: 151 MMHG | HEIGHT: 72 IN | DIASTOLIC BLOOD PRESSURE: 75 MMHG | HEART RATE: 53 BPM | BODY MASS INDEX: 25.73 KG/M2 | WEIGHT: 190 LBS | RESPIRATION RATE: 16 BRPM | OXYGEN SATURATION: 98 %

## 2025-03-20 DIAGNOSIS — D75.89 OTHER SPECIFIED DISEASES OF BLOOD AND BLOOD-FORMING ORGANS: ICD-10-CM

## 2025-03-20 DIAGNOSIS — D69.6 THROMBOCYTOPENIA, UNSPECIFIED: ICD-10-CM

## 2025-03-20 DIAGNOSIS — R79.89 OTHER SPECIFIED ABNORMAL FINDINGS OF BLOOD CHEMISTRY: ICD-10-CM

## 2025-03-20 PROCEDURE — 99214 OFFICE O/P EST MOD 30 MIN: CPT

## 2025-07-17 ENCOUNTER — APPOINTMENT (OUTPATIENT)
Dept: NEPHROLOGY | Facility: CLINIC | Age: 80
End: 2025-07-17
Payer: MEDICARE

## 2025-07-17 VITALS
HEART RATE: 61 BPM | DIASTOLIC BLOOD PRESSURE: 76 MMHG | OXYGEN SATURATION: 98 % | BODY MASS INDEX: 26.85 KG/M2 | WEIGHT: 198 LBS | SYSTOLIC BLOOD PRESSURE: 138 MMHG

## 2025-07-17 PROCEDURE — 99204 OFFICE O/P NEW MOD 45 MIN: CPT

## 2025-07-17 PROCEDURE — G2211 COMPLEX E/M VISIT ADD ON: CPT

## (undated) DEVICE — Device

## (undated) DEVICE — SYS DEL CONTROLLER ANGIOTOUCH

## (undated) DEVICE — PACK CATH CARDIAC KIT DSG SCSR FRCP

## (undated) DEVICE — ELCTR ZOLL DEFIBRILLATOR PAD NO REPLACEMENT

## (undated) DEVICE — DRSG TEGADERM 2.5X3"

## (undated) DEVICE — MANIFOLD ANGIO AUTOMD TRNDCR

## (undated) DEVICE — CATH CARDIAC RT CUSET 601201319

## (undated) DEVICE — ULTRASOUND TRANSDUCER COVER

## (undated) DEVICE — NDL PERCU ECHOTIP 21G X 4CM

## (undated) DEVICE — BAND RADIAL COMPRESSION DEVICE REG 24CM

## (undated) DEVICE — TUBING IV EXTENSION 30"

## (undated) DEVICE — PACING CABLE (BLUE) ATRIAL TEMP SCREW DOWN 12FT

## (undated) DEVICE — SYR MED A2000 SYRINGE KIT ACIST REUS

## (undated) DEVICE — TUBING EXTENSION HI PRESSURE FLEX 48"

## (undated) DEVICE — DRSG QUICKCLOT HEMOSTATIC 4X4 FOIL

## (undated) DEVICE — DRAPE SMALL W ADHESIVE APERTURE